# Patient Record
Sex: MALE | Race: BLACK OR AFRICAN AMERICAN | Employment: OTHER | URBAN - METROPOLITAN AREA
[De-identification: names, ages, dates, MRNs, and addresses within clinical notes are randomized per-mention and may not be internally consistent; named-entity substitution may affect disease eponyms.]

---

## 2017-05-03 ENCOUNTER — HOSPITAL ENCOUNTER (OUTPATIENT)
Dept: GENERAL RADIOLOGY | Age: 67
Discharge: HOME | End: 2017-05-03
Payer: MEDICARE

## 2017-05-03 DIAGNOSIS — R04.2 HEMOPTYSIS: ICD-10-CM

## 2017-05-03 PROCEDURE — 71020 XR CHEST PA LAT: CPT

## 2017-12-03 ENCOUNTER — HOSPITAL ENCOUNTER (EMERGENCY)
Age: 67
Discharge: HOME | End: 2017-12-03
Attending: EMERGENCY MEDICINE | Admitting: EMERGENCY MEDICINE
Payer: MEDICARE

## 2017-12-03 VITALS
HEART RATE: 74 BPM | OXYGEN SATURATION: 97 % | RESPIRATION RATE: 18 BRPM | TEMPERATURE: 97.3 F | SYSTOLIC BLOOD PRESSURE: 130 MMHG | DIASTOLIC BLOOD PRESSURE: 70 MMHG

## 2017-12-03 DIAGNOSIS — M54.2 NECK PAIN: Primary | ICD-10-CM

## 2017-12-03 PROCEDURE — 96372 THER/PROPH/DIAG INJ SC/IM: CPT

## 2017-12-03 PROCEDURE — 99283 EMERGENCY DEPT VISIT LOW MDM: CPT

## 2017-12-03 PROCEDURE — 74011250636 HC RX REV CODE- 250/636: Performed by: EMERGENCY MEDICINE

## 2017-12-03 PROCEDURE — 74011250637 HC RX REV CODE- 250/637: Performed by: EMERGENCY MEDICINE

## 2017-12-03 RX ORDER — LIDOCAINE 50 MG/G
PATCH TOPICAL
Qty: 15 EACH | Refills: 0 | Status: SHIPPED | OUTPATIENT
Start: 2017-12-03

## 2017-12-03 RX ORDER — KETOROLAC TROMETHAMINE 30 MG/ML
30 INJECTION, SOLUTION INTRAMUSCULAR; INTRAVENOUS
Status: COMPLETED | OUTPATIENT
Start: 2017-12-03 | End: 2017-12-03

## 2017-12-03 RX ORDER — DIAZEPAM 5 MG/1
5 TABLET ORAL
Qty: 12 TAB | Refills: 0 | Status: SHIPPED | OUTPATIENT
Start: 2017-12-03 | End: 2022-09-20

## 2017-12-03 RX ORDER — LIDOCAINE 50 MG/G
1 PATCH TOPICAL
Status: DISCONTINUED | OUTPATIENT
Start: 2017-12-03 | End: 2017-12-03 | Stop reason: HOSPADM

## 2017-12-03 RX ADMIN — KETOROLAC TROMETHAMINE 30 MG: 30 INJECTION, SOLUTION INTRAMUSCULAR at 04:36

## 2017-12-03 NOTE — ED PROVIDER NOTES
EMERGENCY DEPARTMENT HISTORY AND PHYSICAL EXAM    4:22 AM      Date: 12/3/2017  Patient Name: Gabrielle Bowles    History of Presenting Illness     Chief Complaint   Patient presents with    Neck Pain         History Provided By: Patient    Chief Complaint: Neck pain  Duration:  Days  Timing:  Worsening  Location: Right sided   Quality: Sharp  Severity: Severe  Modifying Factors: Tried heat packs, ibuprofen, and Tylenol with no relief   Associated Symptoms: HA      Additional History (Context):     Gabrielle Bowles is a 61 y.o. male with a pertinent history of DM and HTN presents to the ED via POV c/o non-traumatic, sharp, \"unbearable,\" right sided neck pain radiating up to head x 3 days, worsening today. Pain is described as \"like a spasm. \" States he tried heat packs, ibuprofen, and Tylenol with no relief. Pt denies extremity numbness/tingling, back pain, bowel/bladder incontinence. No other acute symptoms or complaints were noted. PCP: Momo Moreno MD    Current Facility-Administered Medications   Medication Dose Route Frequency Provider Last Rate Last Dose    lidocaine (LIDODERM) 5 % patch 1 Patch  1 Patch TransDERmal NOW Josué Golden MD   1 Patch at 12/03/17 0436     Current Outpatient Prescriptions   Medication Sig Dispense Refill    lidocaine (LIDODERM) 5 % Apply patch to the affected area for 12 hours a day and remove for 12 hours a day. 15 Each 0    diazePAM (VALIUM) 5 mg tablet Take 1 Tab by mouth every eight (8) hours as needed (pain). Max Daily Amount: 15 mg. 12 Tab 0    amLODIPine (NORVASC) 10 mg tablet Take 10 mg by mouth daily.  exenatide (BYETTA) 10 mcg/0.04 mL PnIj injection 10 mcg by SubCUTAneous route Before breakfast and dinner.  chlorthalidone (HYGROTEN) 50 mg tablet Take 50 mg by mouth daily.  dicyclomine (BENTYL) 20 mg tablet Take 20 mg by mouth two (2) times a day.  doxazosin (CARDURA) 8 mg tablet Take 8 mg by mouth daily.         insulin glargine (LANTUS) 100 unit/mL injection 55 Units by SubCUTAneous route nightly.  losartan (COZAAR) 100 mg tablet Take 100 mg by mouth daily.  metFORMIN (GLUCOPHAGE) 1,000 mg tablet Take 1,000 mg by mouth two (2) times daily (with meals).  METOPROLOL TARTRATE PO Take 200 mg by mouth two (2) times a day.  omeprazole (PRILOSEC) 40 mg capsule Take 40 mg by mouth daily.  quinapril (ACCUPRIL) 40 mg tablet Take 80 mg by mouth daily.  simvastatin (ZOCOR) 40 mg tablet Take 40 mg by mouth nightly. Past History     Past Medical History:  Past Medical History:   Diagnosis Date    Diabetes ([address]     Hypertension        Past Surgical History:  Past Surgical History:   Procedure Laterality Date    HX BACK SURGERY      HX MOHS PROCEDURES         Family History:  Family History   Problem Relation Age of Onset    Diabetes Other     Hypertension Other        Social History:  Social History   Substance Use Topics    Smoking status: Never Smoker    Smokeless tobacco: Never Used    Alcohol use No       Allergies:  No Known Allergies      Review of Systems       Review of Systems   Gastrointestinal: Negative for constipation. Musculoskeletal: Positive for neck pain. Neurological: Positive for headaches. Negative for numbness. All other systems reviewed and are negative. Physical Exam     Visit Vitals    /70 (BP 1 Location: Left arm, BP Patient Position: At rest)    Pulse 74    Temp 97.3 °F (36.3 °C)    Resp 18    SpO2 97%         Physical Exam   Constitutional: He is oriented to person, place, and time. He appears well-developed. HENT:   Head: Normocephalic and atraumatic. Eyes: Conjunctivae and EOM are normal.   Cardiovascular: Normal heart sounds. Exam reveals no gallop and no friction rub. No murmur heard. Pulmonary/Chest: Effort normal and breath sounds normal. No stridor. Abdominal: Soft. There is no tenderness. Musculoskeletal: Normal range of motion.  He exhibits no tenderness. R paracervical tightness. No midline cervical tenderness. Normal ROM of R shoulder. Neurological: He is alert and oriented to person, place, and time. Normal motor and sensation throughout all extremities      Skin: Skin is warm and dry. He is not diaphoretic. Psychiatric: He has a normal mood and affect. His behavior is normal.   Nursing note and vitals reviewed. Diagnostic Study Results     Labs -  No results found for this or any previous visit (from the past 12 hour(s)). Radiologic Studies -   No orders to display         Medical Decision Making   Provider Notes (Medical Decision Making): Here with R para cervical tenderness without any hard neurological signs, no precipitating trauma, no fever or other suggestion of meningismus. Will treat symptomatically here and dc for close PMD follow up. I am the first provider for this patient. I reviewed the vital signs, available nursing notes, past medical history, past surgical history, family history and social history. Vital Signs-Reviewed the patient's vital signs. Pulse Oximetry Analysis -  97% on room air (Interpretation)      Records Reviewed: Nursing Notes and Old Medical Records (Time of Review: 4:22 AM)    ED Course: Progress Notes, Reevaluation, and Consults:  -Re-evaluted the patient     -We discussed the diagnosis, treatment, and plan. Next steps in close outpt care include: PMD follow-up    -They verbally convey understanding and agreement of the signs, symptoms, diagnosis, treatment and prognosis and additionally agree to follow up as discussed. All questions were answered, and we reviewed pertinent return precautions as seen in the discharge paperwork. Pt understood follow up instructions, and would return to the ED if any worsening or concerns. Maura Plunkett MD  4:48 AM    Diagnosis     Clinical Impression:   1.  Neck pain        Disposition: Discharged     Follow-up Information     Follow up With Details Comments Contact Info    Dandre Dawson MD Schedule an appointment as soon as possible for a visit  [address] [address] [facility] EMERGENCY DEPT  If symptoms worsen [address] [facility] [address]  [phone]           Discharge Medication List as of 12/3/2017  4:42 AM      START taking these medications    Details   lidocaine (LIDODERM) 5 % Apply patch to the affected area for 12 hours a day and remove for 12 hours a day., Print, Disp-15 Each, R-0      diazePAM (VALIUM) 5 mg tablet Take 1 Tab by mouth every eight (8) hours as needed (pain). Max Daily Amount: 15 mg., Print, Disp-12 Tab, R-0         CONTINUE these medications which have NOT CHANGED    Details   amLODIPine (NORVASC) 10 mg tablet Take 10 mg by mouth daily. , Historical Med      exenatide (BYETTA) 10 mcg/0.04 mL PnIj injection 10 mcg by SubCUTAneous route Before breakfast and dinner.  , Historical Med      chlorthalidone (HYGROTEN) 50 mg tablet Take 50 mg by mouth daily. , Historical Med      dicyclomine (BENTYL) 20 mg tablet Take 20 mg by mouth two (2) times a day.  , Historical Med      doxazosin (CARDURA) 8 mg tablet Take 8 mg by mouth daily. , Historical Med      insulin glargine (LANTUS) 100 unit/mL injection 55 Units by SubCUTAneous route nightly.  , Historical Med      losartan (COZAAR) 100 mg tablet Take 100 mg by mouth daily. , Historical Med      metFORMIN (GLUCOPHAGE) 1,000 mg tablet Take 1,000 mg by mouth two (2) times daily (with meals). , Historical Med      METOPROLOL TARTRATE PO Take 200 mg by mouth two (2) times a day.  , Historical Med      omeprazole (PRILOSEC) 40 mg capsule Take 40 mg by mouth daily. , Historical Med      quinapril (ACCUPRIL) 40 mg tablet Take 80 mg by mouth daily.   , Historical Med      simvastatin (ZOCOR) 40 mg tablet Take 40 mg by mouth nightly.  , Historical Med           _______________________________    Attestations:  Harshad Attestation     Ly No acting as a scribe for and in the presence of Colleen Crocker MD      December 03, 2017 at 4:22 AM       Provider Attestation:      I personally performed the services described in the documentation, reviewed the documentation, as recorded by the scribe in my presence, and it accurately and completely records my words and actions.  December 03, 2017 at 4:22 AM - Colleen Crocker MD    _______________________________

## 2017-12-03 NOTE — ED TRIAGE NOTES
Pt stating his neck pain started 3-4 days ago and his pain has increased tonight. Pt is rating pain 10/10. Pt is AOX4.

## 2017-12-03 NOTE — ED NOTES
Reviewed dc instructions with patient. Pt sent home with 2 prescriptions. Pt instructed on prescription use. Pt instructed to follow up with pcp this week. Pt signed paper dc instructions; Rn placed in scan bin. Pt left ED with no distress and all belongings.

## 2018-09-21 ENCOUNTER — HOSPITAL ENCOUNTER (OUTPATIENT)
Dept: GENERAL RADIOLOGY | Age: 68
Discharge: HOME | End: 2018-09-21
Payer: MEDICARE

## 2018-09-21 DIAGNOSIS — R07.9 CHEST PAIN: ICD-10-CM

## 2018-09-21 PROCEDURE — 71046 X-RAY EXAM CHEST 2 VIEWS: CPT

## 2018-12-11 ENCOUNTER — HOSPITAL ENCOUNTER (OUTPATIENT)
Dept: CT IMAGING | Age: 68
Discharge: HOME | End: 2018-12-11
Attending: FAMILY MEDICINE
Payer: MEDICARE

## 2018-12-11 DIAGNOSIS — R04.2 COUGHING UP BLOOD: ICD-10-CM

## 2018-12-11 LAB — CREAT UR-MCNC: 1.3 MG/DL (ref 0.6–1.3)

## 2018-12-11 PROCEDURE — 74011636320 HC RX REV CODE- 636/320: Performed by: FAMILY MEDICINE

## 2018-12-11 PROCEDURE — 82565 ASSAY OF CREATININE: CPT

## 2018-12-11 PROCEDURE — 71260 CT THORAX DX C+: CPT

## 2018-12-11 RX ADMIN — IOPAMIDOL 75 ML: 612 INJECTION, SOLUTION INTRAVENOUS at 10:52

## 2019-01-16 ENCOUNTER — HOSPITAL ENCOUNTER (OUTPATIENT)
Dept: ULTRASOUND IMAGING | Age: 69
Discharge: HOME | End: 2019-01-16
Attending: FAMILY MEDICINE
Payer: MEDICARE

## 2019-01-16 DIAGNOSIS — E04.1 NONTOXIC UNINODULAR GOITER: ICD-10-CM

## 2019-01-16 PROCEDURE — 76536 US EXAM OF HEAD AND NECK: CPT

## 2020-02-03 ENCOUNTER — HOSPITAL ENCOUNTER (OUTPATIENT)
Dept: CT IMAGING | Age: 70
Discharge: HOME | End: 2020-02-03
Attending: INTERNAL MEDICINE
Payer: MEDICARE

## 2020-02-03 DIAGNOSIS — R91.1 PULMONARY NODULE: ICD-10-CM

## 2020-02-03 PROCEDURE — 71250 CT THORAX DX C-: CPT

## 2021-01-25 ENCOUNTER — CLAIMS CREATED FROM THE CLAIM WINDOW (OUTPATIENT)
Dept: URBAN - METROPOLITAN AREA CLINIC 4 | Facility: CLINIC | Age: 71
End: 2021-01-25
Payer: COMMERCIAL

## 2021-01-25 ENCOUNTER — OFFICE VISIT (OUTPATIENT)
Dept: URBAN - METROPOLITAN AREA SURGERY CENTER 13 | Facility: SURGERY CENTER | Age: 71
End: 2021-01-25
Payer: COMMERCIAL

## 2021-01-25 DIAGNOSIS — D12.4 BENIGN NEOPLASM OF DESCENDING COLON: ICD-10-CM

## 2021-01-25 DIAGNOSIS — K63.5 BENIGN COLON POLYP: ICD-10-CM

## 2021-01-25 DIAGNOSIS — Z86.010 H/O ADENOMATOUS POLYP OF COLON: ICD-10-CM

## 2021-01-25 DIAGNOSIS — D12.4 ADENOMA OF DESCENDING COLON: ICD-10-CM

## 2021-01-25 DIAGNOSIS — K63.89 OTHER SPECIFIED DISEASES OF INTESTINE: ICD-10-CM

## 2021-01-25 PROCEDURE — 45380 COLONOSCOPY AND BIOPSY: CPT | Performed by: INTERNAL MEDICINE

## 2021-01-25 PROCEDURE — 88305 TISSUE EXAM BY PATHOLOGIST: CPT | Performed by: PATHOLOGY

## 2021-01-25 PROCEDURE — G8907 PT DOC NO EVENTS ON DISCHARG: HCPCS | Performed by: INTERNAL MEDICINE

## 2021-01-25 PROCEDURE — 45385 COLONOSCOPY W/LESION REMOVAL: CPT | Performed by: INTERNAL MEDICINE

## 2021-07-15 ENCOUNTER — OFFICE VISIT (OUTPATIENT)
Dept: PULMONOLOGY | Age: 71
End: 2021-07-15

## 2021-07-15 VITALS
TEMPERATURE: 97.7 F | WEIGHT: 232.3 LBS | DIASTOLIC BLOOD PRESSURE: 80 MMHG | OXYGEN SATURATION: 94 % | RESPIRATION RATE: 22 BRPM | SYSTOLIC BLOOD PRESSURE: 128 MMHG | HEIGHT: 71 IN | HEART RATE: 86 BPM | BODY MASS INDEX: 32.52 KG/M2

## 2021-07-15 DIAGNOSIS — R06.02 SHORTNESS OF BREATH: Primary | ICD-10-CM

## 2021-07-15 DIAGNOSIS — J98.4 RESTRICTIVE LUNG DISEASE: ICD-10-CM

## 2021-07-15 DIAGNOSIS — I27.20 PULMONARY HTN (HCC): ICD-10-CM

## 2021-07-15 DIAGNOSIS — J44.9 ASTHMA-COPD OVERLAP SYNDROME (HCC): ICD-10-CM

## 2021-07-15 PROCEDURE — G8536 NO DOC ELDER MAL SCRN: HCPCS | Performed by: INTERNAL MEDICINE

## 2021-07-15 PROCEDURE — 1101F PT FALLS ASSESS-DOCD LE1/YR: CPT | Performed by: INTERNAL MEDICINE

## 2021-07-15 PROCEDURE — 94727 GAS DIL/WSHOT DETER LNG VOL: CPT | Performed by: INTERNAL MEDICINE

## 2021-07-15 PROCEDURE — 94618 PULMONARY STRESS TESTING: CPT | Performed by: INTERNAL MEDICINE

## 2021-07-15 PROCEDURE — 94729 DIFFUSING CAPACITY: CPT | Performed by: INTERNAL MEDICINE

## 2021-07-15 PROCEDURE — G8417 CALC BMI ABV UP PARAM F/U: HCPCS | Performed by: INTERNAL MEDICINE

## 2021-07-15 PROCEDURE — 99204 OFFICE O/P NEW MOD 45 MIN: CPT | Performed by: INTERNAL MEDICINE

## 2021-07-15 PROCEDURE — G8427 DOCREV CUR MEDS BY ELIG CLIN: HCPCS | Performed by: INTERNAL MEDICINE

## 2021-07-15 PROCEDURE — 94060 EVALUATION OF WHEEZING: CPT | Performed by: INTERNAL MEDICINE

## 2021-07-15 PROCEDURE — 3017F COLORECTAL CA SCREEN DOC REV: CPT | Performed by: INTERNAL MEDICINE

## 2021-07-15 PROCEDURE — G8510 SCR DEP NEG, NO PLAN REQD: HCPCS | Performed by: INTERNAL MEDICINE

## 2021-07-15 NOTE — PROGRESS NOTES
[facility]  Pulmonary, Critical Care, and Sleep Medicine      Pulmonary Office Initial Consultation    Name: Bailey Devine     : 1950     Date: 7/15/2021        Subjective:   Patient has been referred for evaluation of: SOB, -abnormal PFT at PCP clinic in 2019, history of exposure to airborne occupational aerosols. Patient is a 78 y.o. male gives a history of progressive shortness of breath over the past 5 to 6 years. He describes the shortness of breath predominantly with activity affecting activities of daily living over the time described. He states that he stays short of breath with any physical activity and especially if he were to walk in the grocery store where he actually has to use the shopping basket to support himself and walk. He denies symptoms at rest denies any paroxysmal nocturnal dyspnea but admits to having orthopnea having to sleep in a recliner. Patient states that he has been trying to find out what the problem is since he has significant exposure over the years during his working career at [employer] which later was [employer]. He worked for 30 years and had exposure to significant aerosols, dust related to spraying pesticides and several other chemicals. He retired in . Patient is a never smoker  He denies cough, chest pain  Denies any pedal edema  Does have some arthritic pain-usually some stiffness in hand joints. 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   Environmental exposures- lives independently has 1 dog  ILD history:  No Hx of connective tissue disease such as RA, Lupus, Scleroderma  No Hx Raynauds  No Hx sarcoidosis  No Hx taking medications- methotrexate, Amiodarone, Nitrofurantoin  No Hx cancer, chemotherapy, radiation  No Hx Birds, chickens, farm animals  No Hx using hair spray  Possible exposure to  asbestos exposure, chemicals, pesticides  No Hx smoking  No Hx TB/positive PPD  No Hx covid-19 pneumonia       Review of data:  I have personally reviewed all data-clinical encounters, imaging, outside test results pertinent to patient's care. Testing:  CXR  CT scan-12/11/2018 and 2/3/2020 with findings of a stable 9 mm left lower lobe nodule  P[facility] in September 2019 with FEV1 of 54%  Echo-results not available    Past Medical History:   Diagnosis Date    Diabetes ([facility] [address]     Hypertension        Past Surgical History:   Procedure Laterality Date    HX BACK SURGERY      HX MOHS PROCEDURES         Social History     Socioeconomic History    Marital status:      Spouse name: Not on file    Number of children: Not on file    Years of education: Not on file    Highest education level: Not on file   Tobacco Use    Smoking status: Never Smoker    Smokeless tobacco: Never Used   Substance and Sexual Activity    Alcohol use: No    Drug use: No     Social Determinants of Health     Financial Resource Strain:     Difficulty of Paying Living Expenses:    Food Insecurity:     Worried About Running Out of Food in the Last Year:     [address] in the Last Year:    Transportation Needs:     Lack of Transportation (Medical):      Lack of Transportation (Non-Medical):    Physical Activity:     Days of Exercise per Week:     Minutes of Exercise per Session:    Stress:     Feeling of Stress :    Social Connections:     Frequency of Communication with Friends and Family:     Frequency of Social Gatherings with Friends and Family:     Attends Voodoo Services:     Active Member of Clubs or Organizations:     Attends Club or Organization Meetings:     Marital Status:        Family History   Problem Relation Age of Onset    Diabetes Other     Hypertension Other        No Known Allergies    . Current Outpatient Medications   Medication Sig Dispense Refill    chlorthalidone (HYGROTEN) 50 mg tablet Take 50 mg by mouth daily.  dicyclomine (BENTYL) 20 mg tablet Take 20 mg by mouth two (2) times a day.  doxazosin (CARDURA) 8 mg tablet Take 8 mg by mouth daily.  metFORMIN (GLUCOPHAGE) 1,000 mg tablet Take 1,000 mg by mouth two (2) times daily (with meals).  omeprazole (PRILOSEC) 40 mg capsule Take 40 mg by mouth daily.  simvastatin (ZOCOR) 40 mg tablet Take 40 mg by mouth nightly.  lidocaine (LIDODERM) 5 % Apply patch to the affected area for 12 hours a day and remove for 12 hours a day. (Patient not taking: Reported on 7/15/2021) 15 Each 0    diazePAM (VALIUM) 5 mg tablet Take 1 Tab by mouth every eight (8) hours as needed (pain). Max Daily Amount: 15 mg. (Patient not taking: Reported on 7/15/2021) 12 Tab 0    amLODIPine (NORVASC) 10 mg tablet Take 10 mg by mouth daily. (Patient not taking: Reported on 7/15/2021)      exenatide (BYETTA) 10 mcg/0.04 mL PnIj injection 10 mcg by SubCUTAneous route Before breakfast and dinner. (Patient not taking: Reported on 7/15/2021)      insulin glargine (LANTUS) 100 unit/mL injection 55 Units by SubCUTAneous route nightly. (Patient not taking: Reported on 7/15/2021)      losartan (COZAAR) 100 mg tablet Take 100 mg by mouth daily. (Patient not taking: Reported on 7/15/2021)      METOPROLOL TARTRATE PO Take 200 mg by mouth two (2) times a day. (Patient not taking: Reported on 7/15/2021)      quinapril (ACCUPRIL) 40 mg tablet Take 80 mg by mouth daily.    (Patient not taking: Reported on 7/15/2021)           Review of Systems:  HEENT: No epistaxis, no nasal drainage, no difficulty in swallowing, no redness in eyes  Respiratory: as above  Cardiovascular: no chest pain, no palpitations, no chronic leg edema, no syncope  Gastrointestinal: no abd pain, no vomiting, no diarrhea, no bleeding symptoms  Genitourinary: No urinary symptoms or hematuria  Integument/breast: No ulcers or rashes  Musculoskeletal:Neg  Neurological: No focal weakness, no seizures, no headaches  Behvioral/Psych: No anxiety, no depression  Constitutional: No fever, no chills, no weight loss, no night sweats     Objective:     Visit Vitals  /80 (BP 1 Location: Left upper arm, BP Patient Position: Sitting, BP Cuff Size: Adult)   Pulse 86   Temp 97.7 °F (36.5 °C) (Oral)   Resp 22   Ht 5' 11\" (1.803 m)   Wt 105.4 kg (232 lb 4.8 oz)   SpO2 94%   BMI 32.40 kg/m²        Physical Exam:   General: comfortable, no acute distress  HEENT: pupils reactive, sclera anicteric, EOM intact  Neck: No adenopathy or thyroid swelling, no lymphadenopathy or JVD, supple  CVS: S1S2 no murmurs  RS: Mod AE bilaterally, no tactile fremitus or egophony, no accessory muscle use  Abd: soft, non tender, no hepatosplenomegaly  Neuro: non focal, awake, alert  Extrm: no leg edema, clubbing or cyanosis  Skin: no rash    Data review:   Pertinent labs: CBC, BMP, LFT's      PFT:    Date FVC FEV1  FEV1/FVC [id number] TLC RV RV/TLC VC DLCO   7/15/2021  55  48  67  33  71  84  117  64  67/106                                         6 min walk test; completed 7/15/2021    No significant oxygen desaturation    No results found for this or any previous visit. Imaging:  I have personally reviewed the patients radiographs and have reviewed the reports:  XR Results (most recent):  Results from Hospital Encounter encounter on 09/21/18    XR CHEST PA LAT    Narrative  EXAMINATION: Chest 2 views    INDICATION: Chest pain    COMPARISON: 5/3/2017    FINDINGS: Frontal and lateral views of the chest obtained. No consolidation. Mediastinal silhouette and pulmonary vasculature unremarkable. A few aortic arch  calcifications. No evidence of pneumothorax. No acute osseous findings. Impression  IMPRESSION:  1.  No acute findings. CT Results (most recent):  Results from Hospital Encounter encounter on 02/03/20    CT CHEST WO CONT    Narrative  Noncontrast CT chest    CPT code 46655    CLINICAL HISTORY: COPD    TECHNIQUE: 5 mm helical MDCT scan of the chest without contrast. Sagittal and  coronal reformations then created with original data set. All CT scans at this  facility are performed using dose optimization techniques as appropriate to a  performed exam, to include automated exposure control, adjustment of the mA  and/or kV according to patient's size (including appropriate matching for site  specific examinations), or use of iterative reconstruction technique. COMPARISON: 12/11/2018    FINDINGS:    Lungs: There is 9 mm left lower lobe pulmonary nodule (30), stable since prior  study. Lungs otherwise clear. Airways: Patent    Pleural space: No effusions    Heart and pericardium: Marked coronary artery calcifications of the LAD. No  pericardial effusion    Great vessels and mediastinum: Mild burden arteriosclerosis aortic arch. Main  pulmonary artery measures 29 mm. Aorta the same level measures 30 mm. Right  pulmonary artery 24 mm. Left pulmonary artery 26 mm. Focal dilatation of left  descending pulmonary artery, 12 mm compared with the right which measures 6 mm. Lymph nodes: No adenopathy    Base of neck: Thyroid gland normal. No adenopathy. Upper abdomen: Included solid organs and hollow viscera are unremarkable. MSK/body wall: Previous cervical fusion. Hypertrophic right AC joint. Hypertrophic sternoclavicular junctions, sternomanubrial junction    Impression  IMPRESSION:    Stable left lower lobe pulmonary nodule. Prominent pulmonary arteries. Possibly developing pulmonary artery hypertension. Coronary artery disease. .   There is no problem list on file for this patient.       IMPRESSION:   · Shortness of breath-progressive over past 5 years predominantly exertional with history of occupational exposure to chemical and pesticide inhalation and PFT findings of combined obstructive and additional restrictive ventilatory impairment. Appears to have fixed airways obstruction likely asthma with remodeling leading to progressive symptomatic worsening. Restrictive component likely from body habitus with abdominal obesity since diffusion capacity corrects for alveolar volume. In addition to functional and structural lung impairment additional component of pulmonary hypertension with diastolic heart failure may also be playing a role-patient has symptoms of orthopnea  · Lung nodule-stable 9 mm nodule in left lower lobe. Unclear etiology but likely benign with no change over 2 years of imaging  · Diabetes  · Hypertension      RECOMMENDATIONS:   · I have discussed differential diagnosis with the patient and made following recommendations  · We will complete 6-minute walk  · Check echocardiogram for quantification of pulmonary hypertension diastolic heart failure  · Obtain HRCT looking for occult ILD as well as stability of the nodule  · Once testing completed will discuss with patient regarding any therapeutic interventions to alleviate his symptoms of dyspnea  · Maintain healthy weight  · Maintain active lifestyle  · Consider evaluation for sleep apnea as additional factor  · Preventive vaccinations-has received Covid vaccine  · Will provide more information about patient's occupational exposure and impact on current clinical state once work-up has been completed. · Follow-up in 2 months     Health maintenance screens deferred to Primary care provider. Deshawn Maharaj MD    This patient has a high complexity chronic care condition   This Visit needed High complexity medically necessary decision making and management plans.

## 2021-07-15 NOTE — LETTER
7/15/2021    Patient: Gladis Mcmahon   YOB: 1950   Date of Visit: 7/15/2021     Anh Johnson MD  [facility] [address]  Via Fax: [address], [address] [address]  Via Fax: [phone]    Dear MD Ryland Darden MD,      Thank you for referring Mr. Gladis Mcmahon to [address] [place] [address] for evaluation. My notes for this consultation are attached. If you have questions, please do not hesitate to call me. I look forward to following your patient along with you.       Sincerely,    Ernst Gross MD

## 2021-07-15 NOTE — PROGRESS NOTES
Daniel Tineo presents today for   Chief Complaint   Patient presents with    Shortness of Breath    Results     Chest CT        Is someone accompanying this pt? No    Is the patient using any DME equipment during OV? No    -DME Company NA    Depression Screening:  3 most recent PHQ Screens 7/15/2021   Little interest or pleasure in doing things Not at all   Feeling down, depressed, irritable, or hopeless Not at all   Total Score PHQ 2 0       Learning Assessment:  Learning Assessment 7/15/2021   PRIMARY LEARNER Patient   PRIMARY LANGUAGE ENGLISH   LEARNER PREFERENCE PRIMARY DEMONSTRATION   ANSWERED BY Patient   RELATIONSHIP SELF       Abuse Screening:  No flowsheet data found. Fall Risk  Fall Risk Assessment, last 12 mths 7/15/2021   Able to walk? Yes   Fall in past 12 months? 0   Do you feel unsteady? 0   Are you worried about falling 0         Coordination of Car1. Have you been to the ER, urgent care clinic since your last visit? Hospitalized since your last visit? No       2. Have you seen or consulted any other health care providers outside of the [address] [place] [address] Alessandro since your last visit? Include any pap smears or colon screening.  No

## 2021-07-28 ENCOUNTER — HOSPITAL ENCOUNTER (OUTPATIENT)
Dept: CT IMAGING | Age: 71
Discharge: HOME | End: 2021-07-28
Attending: INTERNAL MEDICINE
Payer: MEDICARE

## 2021-07-28 ENCOUNTER — HOSPITAL ENCOUNTER (OUTPATIENT)
Dept: NON INVASIVE DIAGNOSTICS | Age: 71
Discharge: HOME | End: 2021-07-28
Attending: INTERNAL MEDICINE
Payer: MEDICARE

## 2021-07-28 VITALS
SYSTOLIC BLOOD PRESSURE: 128 MMHG | HEIGHT: 71 IN | BODY MASS INDEX: 32.48 KG/M2 | DIASTOLIC BLOOD PRESSURE: 80 MMHG | WEIGHT: 232 LBS

## 2021-07-28 DIAGNOSIS — I27.20 PULMONARY HTN (HCC): ICD-10-CM

## 2021-07-28 DIAGNOSIS — R06.02 SHORTNESS OF BREATH: ICD-10-CM

## 2021-07-28 LAB
ECHO AO ROOT DIAM: 3.31 CM
ECHO LA AREA 4C: 15.89 CM2
ECHO LA VOL 2C: 40.27 ML (ref 18–58)
ECHO LA VOL 4C: 39.42 ML (ref 18–58)
ECHO LA VOL BP: 46.59 ML (ref 18–58)
ECHO LA VOL/BSA BIPLANE: 20.71 ML/M2 (ref 16–28)
ECHO LA VOLUME INDEX A2C: 17.9 ML/M2 (ref 16–28)
ECHO LA VOLUME INDEX A4C: 17.52 ML/M2 (ref 16–28)
ECHO LV E' LATERAL VELOCITY: 7.83 CM/S
ECHO LV E' SEPTAL VELOCITY: 9.12 CM/S
ECHO LV INTERNAL DIMENSION DIASTOLIC: 4.56 CM (ref 4.2–5.9)
ECHO LV INTERNAL DIMENSION SYSTOLIC: 3.45 CM
ECHO LV IVSD: 1.16 CM (ref 0.6–1)
ECHO LV MASS 2D: 202.2 G (ref 88–224)
ECHO LV MASS INDEX 2D: 89.9 G/M2 (ref 49–115)
ECHO LV POSTERIOR WALL DIASTOLIC: 1.24 CM (ref 0.6–1)
ECHO LVOT CARDIAC OUTPUT: 4.11 LITER/MINUTE
ECHO LVOT DIAM: 2.05 CM
ECHO LVOT PEAK GRADIENT: 3.04 MMHG
ECHO LVOT PEAK VELOCITY: 87.16 CM/S
ECHO LVOT SV: 59.9 ML
ECHO LVOT VTI: 18.11 CM
ECHO MV A VELOCITY: 68.7 CM/S
ECHO MV E DECELERATION TIME (DT): 209.67 MS
ECHO MV E VELOCITY: 42.87 CM/S
ECHO MV E/A RATIO: 0.62
ECHO MV E/E' LATERAL: 5.48
ECHO MV E/E' RATIO (AVERAGED): 5.09
ECHO MV E/E' SEPTAL: 4.7
ECHO RV TAPSE: 2.16 CM (ref 1.5–2)
ECHO TV REGURGITANT MAX VELOCITY: 280.84 CM/S
ECHO TV REGURGITANT PEAK GRADIENT: 31.55 MMHG
LVOT MG: 1.33 MMHG

## 2021-07-28 PROCEDURE — 93306 TTE W/DOPPLER COMPLETE: CPT

## 2021-07-28 PROCEDURE — 71250 CT THORAX DX C-: CPT

## 2021-09-30 ENCOUNTER — OFFICE VISIT (OUTPATIENT)
Dept: PULMONOLOGY | Age: 71
End: 2021-09-30
Payer: MEDICARE

## 2021-09-30 VITALS
BODY MASS INDEX: 31.36 KG/M2 | RESPIRATION RATE: 18 BRPM | DIASTOLIC BLOOD PRESSURE: 73 MMHG | SYSTOLIC BLOOD PRESSURE: 125 MMHG | WEIGHT: 224 LBS | HEIGHT: 71 IN | OXYGEN SATURATION: 96 % | HEART RATE: 68 BPM | TEMPERATURE: 97.6 F

## 2021-09-30 DIAGNOSIS — J44.9 ASTHMA-COPD OVERLAP SYNDROME (HCC): Primary | ICD-10-CM

## 2021-09-30 DIAGNOSIS — R91.1 LUNG NODULE: ICD-10-CM

## 2021-09-30 DIAGNOSIS — J98.4 RESTRICTIVE LUNG DISEASE: ICD-10-CM

## 2021-09-30 PROCEDURE — 99214 OFFICE O/P EST MOD 30 MIN: CPT | Performed by: INTERNAL MEDICINE

## 2021-09-30 PROCEDURE — 1101F PT FALLS ASSESS-DOCD LE1/YR: CPT | Performed by: INTERNAL MEDICINE

## 2021-09-30 PROCEDURE — G8427 DOCREV CUR MEDS BY ELIG CLIN: HCPCS | Performed by: INTERNAL MEDICINE

## 2021-09-30 PROCEDURE — 3017F COLORECTAL CA SCREEN DOC REV: CPT | Performed by: INTERNAL MEDICINE

## 2021-09-30 PROCEDURE — G8536 NO DOC ELDER MAL SCRN: HCPCS | Performed by: INTERNAL MEDICINE

## 2021-09-30 PROCEDURE — G8432 DEP SCR NOT DOC, RNG: HCPCS | Performed by: INTERNAL MEDICINE

## 2021-09-30 PROCEDURE — G8417 CALC BMI ABV UP PARAM F/U: HCPCS | Performed by: INTERNAL MEDICINE

## 2021-09-30 NOTE — PROGRESS NOTES
[facility]  Pulmonary, Critical Care, and Sleep Medicine      Pulmonary Office follow up    Name: Da Levin     : 1950     Date: 2021        Subjective:   Patient has been referred for evaluation of: SOB, -abnormal PFT at PCP clinic in 2019, history of exposure to airborne occupational aerosols. 21   Patient reports no new symptoms-has shortness of breath and occasional cough. He has been prescribed an inhaler by [facility] care but cannot tell me the name of the inhaler and it is not on record. I have shown him a chart of all the inhalers but he cannot identify and states that he will call us back after he gets home with appropriate information. Since his last visit he has completed requested testing-CT scan of the chest, PFT and 6-minute walk test and here to discuss further  Denies any new concerns but wanted his records to take to his  for a class action suit. HPI  Patient is a 78 y.o. male gives a history of progressive shortness of breath over the past 5 to 6 years. He describes the shortness of breath predominantly with activity affecting activities of daily living over the time described. He states that he stays short of breath with any physical activity and especially if he were to walk in the grocery store where he actually has to use the shopping basket to support himself and walk. He denies symptoms at rest denies any paroxysmal nocturnal dyspnea but admits to having orthopnea having to sleep in a recliner. Patient states that he has been trying to find out what the problem is since he has significant exposure over the years during his working career at [employer] which later was [place]. He worked for 30 years and had exposure to significant aerosols, dust related to spraying pesticides and several other chemicals. He retired in .   Patient is a never smoker  He denies cough, chest pain  Denies any pedal edema  Does have some arthritic pain-usually some stiffness in hand joints. 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 Environmental exposures- lives independently has 1 dog  ILD history:  No Hx of connective tissue disease such as RA, Lupus, Scleroderma  No Hx Raynauds  No Hx sarcoidosis  No Hx taking medications- methotrexate, Amiodarone, Nitrofurantoin  No Hx cancer, chemotherapy, radiation  No Hx Birds, chickens, farm animals  No Hx using hair spray  Possible exposure to  asbestos exposure, chemicals, pesticides  No Hx smoking  No Hx TB/positive PPD  No Hx covid-19 pneumonia       Review of data:  I have personally reviewed all data-clinical encounters, imaging, outside test results pertinent to patient's care.   Testing:  CXR  CT scan-12/11/2018 and 2/3/2020 with findings of a stable 9 mm left lower lobe nodule  [facility] at [facility] in September 2019 with FEV1 of 54%  Echo-results not available    Past Medical History:   Diagnosis Date    Diabetes ([facility] [address]     Hypertension        Past Surgical History:   Procedure Laterality Date    HX BACK SURGERY      HX MOHS PROCEDURES         Social History     Socioeconomic History    Marital status:      Spouse name: Not on file    Number of children: Not on file    Years of education: Not on file    Highest education level: Not on file   Tobacco Use    Smoking status: Never Smoker    Smokeless tobacco: Never Used   Substance and Sexual Activity    Alcohol use: No    Drug use: No     Social Determinants of Health     Financial Resource Strain:     Difficulty of Paying Living Expenses:    Food Insecurity:     Worried About Running Out of Food in the Last Year:     [employer] in the Last Year:    Transportation Needs:     Lack of Transportation (Medical):  Lack of Transportation (Non-Medical):    Physical Activity:     Days of Exercise per Week:     Minutes of Exercise per Session:    Stress:     Feeling of Stress :    Social Connections:     Frequency of Communication with Friends and Family:     Frequency of Social Gatherings with Friends and Family:     Attends Anabaptist Services:     Active Member of Clubs or Organizations:     Attends Club or Organization Meetings:     Marital Status:        Family History   Problem Relation Age of Onset    Diabetes Other     Hypertension Other     No Known Problems Mother     Hypertension Father        No Known Allergies    . Current Outpatient Medications   Medication Sig Dispense Refill    chlorthalidone (HYGROTEN) 50 mg tablet Take 50 mg by mouth daily.  dicyclomine (BENTYL) 20 mg tablet Take 20 mg by mouth two (2) times a day.  metFORMIN (GLUCOPHAGE) 1,000 mg tablet Take 1,000 mg by mouth two (2) times daily (with meals).  omeprazole (PRILOSEC) 40 mg capsule Take 40 mg by mouth daily.  simvastatin (ZOCOR) 40 mg tablet Take 40 mg by mouth nightly.  lidocaine (LIDODERM) 5 % Apply patch to the affected area for 12 hours a day and remove for 12 hours a day. (Patient not taking: Reported on 7/15/2021) 15 Each 0    diazePAM (VALIUM) 5 mg tablet Take 1 Tab by mouth every eight (8) hours as needed (pain). Max Daily Amount: 15 mg. (Patient not taking: Reported on 9/30/2021) 12 Tab 0    amLODIPine (NORVASC) 10 mg tablet Take 10 mg by mouth daily. (Patient not taking: Reported on 9/30/2021)      exenatide (BYETTA) 10 mcg/0.04 mL PnIj injection 10 mcg by SubCUTAneous route Before breakfast and dinner. (Patient not taking: Reported on 7/15/2021)      doxazosin (CARDURA) 8 mg tablet Take 8 mg by mouth daily.    (Patient not taking: Reported on 9/30/2021)      insulin glargine (LANTUS) 100 unit/mL injection 55 Units by SubCUTAneous route nightly. (Patient not taking: Reported on 7/15/2021)      losartan (COZAAR) 100 mg tablet Take 100 mg by mouth daily. (Patient not taking: Reported on 7/15/2021)      METOPROLOL TARTRATE PO Take 200 mg by mouth two (2) times a day. (Patient not taking: Reported on 7/15/2021)      quinapril (ACCUPRIL) 40 mg tablet Take 80 mg by mouth daily.    (Patient not taking: Reported on 7/15/2021)           Review of Systems:  HEENT: No epistaxis, no nasal drainage, no difficulty in swallowing, no redness in eyes  Respiratory: as above  Cardiovascular: no chest pain, no palpitations, no chronic leg edema, no syncope  Gastrointestinal: no abd pain, no vomiting, no diarrhea, no bleeding symptoms  Genitourinary: No urinary symptoms or hematuria  Integument/breast: No ulcers or rashes  Musculoskeletal:Neg  Neurological: No focal weakness, no seizures, no headaches  Behvioral/Psych: No anxiety, no depression  Constitutional: No fever, no chills, no weight loss, no night sweats     Objective:     Visit Vitals  /73 (BP 1 Location: Left upper arm, BP Patient Position: Sitting, BP Cuff Size: Large adult)   Pulse 68   Temp 97.6 °F (36.4 °C) (Oral)   Resp 18   Ht 5' 11\" (1.803 m)   Wt 101.6 kg (224 lb)   SpO2 96% Comment: RA Rest   BMI 31.24 kg/m²        Physical Exam:   General: comfortable, no acute distress  HEENT: pupils reactive, sclera anicteric, EOM intact  Neck: No adenopathy or thyroid swelling, no lymphadenopathy or JVD, supple  CVS: S1S2 no murmurs  RS: Mod AE bilaterally, no tactile fremitus or egophony, no accessory muscle use  Abd: soft, non tender, no hepatosplenomegaly  Neuro: non focal, awake, alert  Extrm: no leg edema, clubbing or cyanosis  Skin: no rash    Data review:   Pertinent labs: CBC, BMP, LFT's      PFT:    Date FVC FEV1  FEV1/FVC [id number] TLC RV RV/TLC VC DLCO   7/15/2021  55  48  67  33  71  84  117  64  67/106                                         6 min walk test; completed 7/15/2021    No significant oxygen desaturation    No results found for this or any previous visit. Imaging:  I have personally reviewed the patients radiographs and have reviewed the reports:  XR Results (most recent):  Results from Hospital Encounter encounter on 09/21/18    XR CHEST PA LAT    Narrative  EXAMINATION: Chest 2 views    INDICATION: Chest pain    COMPARISON: 5/3/2017    FINDINGS: Frontal and lateral views of the chest obtained. No consolidation. Mediastinal silhouette and pulmonary vasculature unremarkable. A few aortic arch  calcifications. No evidence of pneumothorax. No acute osseous findings. Impression  IMPRESSION:  1. No acute findings. CT Results (most recent):  Results from Hospital Encounter encounter on 07/28/21    CT CHEST WO CONT    Narrative  EXAM:  CT Chest without Contrast.    CLINICAL INDICATION:  Shortness of breath (R06.02). COMPARISON:  02/03/20, 12/11/18. TECHNIQUE:    - Helically scanned volumetric axial sections of the chest are obtained without  IV contrast administration. Coronal and sagittal multiplanar reconstruction  images are generated for improved anatomic delineation.  - Radiation dose optimization techniques are utilized as appropriate to the  exam, with combination of automated exposure control, adjustment of the mA  and/or kV according to patient's size (Including appropriate matching for  site-specific examinations), or use of iterative reconstruction technique. FINDINGS:    Lungs:    - Stable left lower lobe superior segment circumscribed lobulated 0.9 x 0.7 cm  nodule (axial #31). Unchanged dating back to 2018. 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 Similar  appearance dating back to at least 12/11/18.  - No airspace opacities are noted.   No dominant lung mass or suspicious new lung  nodule is detected in both lungs. Pleura:  No significant pleural effusion is detected bilaterally. Mediastinum:  No adenopathy. Base of Neck, Axillae, Chest Wall:  No adenopathy. Bilateral gynecomastia. Esophagus:  Unremarkable. Abdomen:  No acute abnormalities in the visualized portions of the upper  abdomen. Bones:  No acute findings. Impression  1. Stable left lower lobe superior segment nodule. Of benign nature based on  long-term stability. 2.  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 3.  Bilateral gynecomastia. .     Patient Active Problem List   Diagnosis Code    SOB (shortness of breath) R06.02    [facility] J44.9    Restrictive lung disease J98.4       IMPRESSION:   · Shortness of breath-progressive over past 5 years predominantly exertional with history of occupational exposure to chemical and pesticide inhalation and PFT findings of combined obstructive and additional restrictive ventilatory impairment. Appears to have fixed airways obstruction likely asthma with remodeling leading to progressive symptomatic worsening. Restrictive component likely from body habitus with abdominal obesity since diffusion capacity corrects for alveolar volume. In addition to functional and structural lung impairment additional component of pulmonary hypertension with diastolic heart failure may also be playing a role. No significant pulmonary hypertension noted  · Lung nodule-stable 9 mm nodule in left lower lobe. Unclear etiology but likely benign with no change over 2 years of imaging  Stable left lower lobe superior segment circumscribed lobulated 0.9 x 0.7 cm  nodule (axial #31). Unchanged dating back to 2018. 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   Similar  appearance dating back to at least 12/11/18.  - No airspace opacities are noted. No dominant lung mass or suspicious new lung  nodule is detected in both lungs. · Diabetes  · Hypertension      RECOMMENDATIONS:   · I have discussed differential diagnosis with the patient and made following recommendations  · Discussed results of 6-minute walk. No significant oxygen desaturation noted  · Discussed results of echocardiogram  · HRCT-stable nodule. No significant ILD  · Continue bronchodilators-benefit from continued use of long-acting agent and short-acting for rescue  · Maintain healthy weight  · Maintain active lifestyle  · Consider evaluation for sleep apnea as additional factor  · Preventive vaccinations-has received Covid vaccine  · Provided patients with printed report of his CAT scan and PFTs  · Preventive vaccinations recommended  · Follow-up in 4 months and sooner should there be any change     Health maintenance screens deferred to Primary care provider. Rolando Mcdaniel MD    This patient has a high complexity chronic care condition   This Visit needed High complexity medically necessary decision making and management plans.

## 2021-09-30 NOTE — PATIENT INSTRUCTIONS
COPD and Asthma: Care Instructions  Overview     Some people who have chronic obstructive pulmonary disease (COPD) may also have asthma. With both of these conditions, air doesn't flow easily in and out of your lungs. This can make it hard to breathe. You may be short of breath, wheeze, cough, or have mucus in your airways. When you have COPD and asthma, it's important to follow your treatment plan. There are two parts to your treatment:  · Controlling COPD and asthma over the long term. · Treating attacks or flare-ups when they occur. You and your doctor can make a plan that will help. This plan tells you the medicines you take to manage your symptoms and prevent attacks or flare-ups. It also tells you what to do if you have an attack or flare-up. Follow-up care is a key part of your treatment and safety. Be sure to make and go to all appointments, and call your doctor if you are having problems. It's also a good idea to know your test results and keep a list of the medicines you take. How can you care for yourself at home? To control COPD and asthma  · Do not smoke. Smoking can make COPD and asthma worse. If you need help quitting, talk to your doctor about stop-smoking programs and medicines. These can increase your chances of quitting for good. · Learn what sets off your COPD and asthma. Avoid these triggers when you can. Common triggers include smoke, pollen, pollution, and infections like colds, the flu, or pneumonia. · Check yourself for symptoms to know which step to follow in your action plan. Watch for things like being short of breath, having chest tightness, and coughing more than usual. Look for a change in the color or thickness of your mucus. Also notice if symptoms wake you up at night or if you get tired quickly when you exercise. · Check your lungs with a peak flow meter, if your doctor recommends it. Peak flow can tell you how well your lungs are working.   · Try pulmonary rehabilitation, if your doctor prescribes it. This combines different treatments to help you reduce your symptoms and stay as active and healthy as you can. Medicines   · Call your doctor if you think you are having a problem with your medicine. COPD and asthma are treated with medicine to help you breathe easier. You may take:  ? Controller medicines. These prevent attacks and flare-ups before they happen. They are taken regularly. There are different types. ? Quick-relief medicine. This is for times when you can't prevent symptoms and need to treat them fast. It can quickly relax the airways and allow you to breathe easier. · Learn how to use your inhalers the right way. Ask your doctor or pharmacist for help. · Use oxygen if your doctor recommends it. Oxygen therapy boosts the amount of oxygen in your blood and helps you breathe easier. Use the flow rate your doctor recommends. Do not change it without talking to your doctor first.  Preventing infections   · Wash your hands often. · Avoid contact with people who have colds and other respiratory infections. · Get a flu vaccine every year. Ask your doctor about getting the pneumococcal and whooping cough (pertussis) shots. To treat attacks when they occur  · Follow your action plan. It can tell you what to do when you have an attack or flare-up. · Take your quick-relief medicine exactly as prescribed. Talk with your doctor if you have any problems with your medicine. ? Keep this medicine with you at all times. ? You may need to use this medicine before you exercise to prevent an attack. · If your doctor prescribed corticosteroid pills or other medicines to use during an attack or flare-up, take them as directed. They may shorten the attack and help you breathe easier. When should you call for help? Call 911 anytime you think you may need emergency care.  For example, call if:    · You have severe trouble breathing.     · You are having chest pain that is different or worse than usual.   Call your doctor now or seek immediate medical care if:    · You have new or worse shortness of breath.     · You cough up blood.     · You have a fever.     · You have used your quick-relief medicine but you are still short of breath.     · You have feelings of anxiety or depression. Watch closely for changes in your health, and be sure to contact your doctor if:    · You are coughing more deeply or more often, or you notice more mucus or a change in the color of your mucus.     · You have new or worse swelling in your legs or belly.     · You are not getting better as expected. Where can you learn more? Go to http://www.gray.com/  Enter A350 in the search box to learn more about \"COPD and Asthma: Care Instructions. \"  Current as of: July 6, 2021               Content Version: 13.0  © 6208-5237 [employer]wise, [employer]. Care instructions adapted under license by [employer] (which disclaims liability or warranty for this information). If you have questions about a medical condition or this instruction, always ask your healthcare professional. [address] any warranty or liability for your use of this information.

## 2021-09-30 NOTE — LETTER
9/30/2021    Patient: Salma Kong   YOB: 1950   Date of Visit: 9/30/2021     Aurelio Burgos MD  [facility] [address]  Via Fax: [phone]    Dear Aurelio Burgos MD,      Thank you for referring Mr. Abdulaziz Infante to [address] [place] [address] for evaluation. My notes for this consultation are attached. If you have questions, please do not hesitate to call me. I look forward to following your patient along with you.       Sincerely,    Janak Fernández MD

## 2021-09-30 NOTE — PROGRESS NOTES
Lisa Hernández presents today for   Chief Complaint   Patient presents with    Results     Chest CT, Echo,,6 Minute walk       Is someone accompanying this pt? No    Is the patient using any DME equipment during OV? No    -DME Company NA    Depression Screening:  3 most recent PHQ Screens 7/15/2021   Little interest or pleasure in doing things Not at all   Feeling down, depressed, irritable, or hopeless Not at all   Total Score PHQ 2 0       Learning Assessment:  Learning Assessment 7/15/2021   PRIMARY LEARNER Patient   PRIMARY LANGUAGE ENGLISH   LEARNER PREFERENCE PRIMARY DEMONSTRATION   ANSWERED BY Patient   RELATIONSHIP SELF       Abuse Screening:  No flowsheet data found. Fall Risk  Fall Risk Assessment, last 12 mths 7/15/2021   Able to walk? Yes   Fall in past 12 months? 0   Do you feel unsteady? 0   Are you worried about falling 0         Coordination of Care:  1. Have you been to the ER, urgent care clinic since your last visit? Hospitalized since your last visit? No    2. Have you seen or consulted any other health care providers outside of the [address] [place] [address] since your last visit? Include any pap smears or colon screening.  No

## 2022-03-18 PROBLEM — J44.89 ASTHMA-COPD OVERLAP SYNDROME (HCC): Status: ACTIVE | Noted: 2021-07-15

## 2022-03-18 PROBLEM — J98.4 RESTRICTIVE LUNG DISEASE: Status: ACTIVE | Noted: 2021-07-15

## 2022-03-18 PROBLEM — J44.9 ASTHMA-COPD OVERLAP SYNDROME (HCC): Status: ACTIVE | Noted: 2021-07-15

## 2022-03-19 PROBLEM — R91.1 LUNG NODULE: Status: ACTIVE | Noted: 2021-09-30

## 2022-03-20 PROBLEM — R06.02 SOB (SHORTNESS OF BREATH): Status: ACTIVE | Noted: 2021-07-15

## 2022-10-31 ENCOUNTER — HOSPITAL ENCOUNTER (OUTPATIENT)
Dept: RADIATION THERAPY | Age: 72
Discharge: HOME | End: 2022-10-31
Payer: MEDICARE

## 2022-10-31 PROCEDURE — 99211 OFF/OP EST MAY X REQ PHY/QHP: CPT

## 2022-10-31 PROCEDURE — 99205 OFFICE O/P NEW HI 60 MIN: CPT | Performed by: RADIOLOGY

## 2022-12-16 ENCOUNTER — TRANSCRIBE ORDER (OUTPATIENT)
Dept: RADIATION THERAPY | Age: 72
End: 2022-12-16

## 2022-12-16 DIAGNOSIS — C61 MALIGNANT NEOPLASM OF PROSTATE (HCC): Primary | ICD-10-CM

## 2022-12-22 ENCOUNTER — TRANSCRIBE ORDER (OUTPATIENT)
Dept: RADIATION THERAPY | Age: 72
End: 2022-12-22

## 2022-12-22 DIAGNOSIS — C61 MALIGNANT NEOPLASM OF PROSTATE (HCC): Primary | ICD-10-CM

## 2022-12-22 DIAGNOSIS — E11.9 TYPE 2 DIABETES MELLITUS WITHOUT COMPLICATION, UNSPECIFIED WHETHER LONG TERM INSULIN USE (HCC): ICD-10-CM

## 2022-12-22 NOTE — PERIOP NOTES
PRE-SURGICAL INSTRUCTIONS        Patient's Name:  Maureen Corona      UNFOC'S Date:  12/22/2022            Covid Testing Date and Time:  vaccinated     Surgery Date:  1/5/2023                Do NOT eat or drink anything, including candy, gum, or ice chips after midnight on 1/5, unless you have specific instructions from your surgeon or anesthesia provider to do so. You may brush your teeth before coming to the hospital.  No smoking 24 hours prior to the day of surgery. No alcohol 24 hours prior to the day of surgery. No recreational drugs for one week prior to the day of surgery. Leave all valuables, including money/purse, at home. Remove all jewelry, nail polish, acrylic nails, and makeup (including mascara); no lotions powders, deodorant, or perfume/cologne/after shave on the skin. Follow instruction for Hibiclens washes and CHG wipes from surgeon's office. Glasses/contact lenses and dentures may be worn to the hospital.  They will be removed prior to surgery. Call your doctor if symptoms of a cold or illness develop within 24-48 hours prior to your surgery. 11.  If you are having an outpatient procedure, please make arrangements for a responsible ADULT TO [address] [place] [address] and stay with you for 24 hours after your surgery. 12. ONE VISITOR in the hospital at this time for outpatient procedures. Exceptions may be made for surgical admissions, per nursing unit guidelines      Special Instructions:      Bring list of CURRENT medications. Bring any pertinent legal medical records. Take these medications the morning of surgery with a sip of water:  as directed by MD   Follow physician instructions about insulin. Follow physician instructions about stopping anticoagulants. On the day of surgery, come in the main entrance of [facility]. Let the  at the desk know you are there for surgery.   A staff member will come escort you to the surgical area on the second floor.    If you have any questions or concerns, please do not hesitate to call:     (Prior to the day of surgery) PAT department:  [phone]   (Day of surgery) Pre-Op department:  [phone]    These surgical instructions were reviewed with Jessica Reddy during the PAT phone call.

## 2022-12-23 ENCOUNTER — HOSPITAL ENCOUNTER (OUTPATIENT)
Dept: PREADMISSION TESTING | Age: 72
End: 2022-12-23
Payer: MEDICARE

## 2022-12-23 DIAGNOSIS — E11.9 TYPE 2 DIABETES MELLITUS WITHOUT COMPLICATION, UNSPECIFIED WHETHER LONG TERM INSULIN USE (HCC): ICD-10-CM

## 2022-12-23 DIAGNOSIS — C61 MALIGNANT NEOPLASM OF PROSTATE (HCC): ICD-10-CM

## 2022-12-23 LAB
ANION GAP SERPL CALC-SCNC: 5 MMOL/L (ref 3–18)
APPEARANCE UR: CLEAR
ATRIAL RATE: 61 BPM
BACTERIA URNS QL MICRO: NEGATIVE /HPF
BASOPHILS # BLD: 0 K/UL (ref 0–0.1)
BASOPHILS NFR BLD: 0 % (ref 0–2)
BILIRUB UR QL: NEGATIVE
BUN SERPL-MCNC: 27 MG/DL (ref 7–18)
BUN/CREAT SERPL: 19 (ref 12–20)
CALCIUM SERPL-MCNC: 9.3 MG/DL (ref 8.5–10.1)
CALCULATED P AXIS, ECG09: 63 DEGREES
CALCULATED R AXIS, ECG10: 9 DEGREES
CALCULATED T AXIS, ECG11: 44 DEGREES
CHLORIDE SERPL-SCNC: 106 MMOL/L (ref 100–111)
CO2 SERPL-SCNC: 27 MMOL/L (ref 21–32)
COLOR UR: YELLOW
CREAT SERPL-MCNC: 1.42 MG/DL (ref 0.6–1.3)
DIAGNOSIS, 93000: NORMAL
DIFFERENTIAL METHOD BLD: ABNORMAL
EOSINOPHIL # BLD: 0.2 K/UL (ref 0–0.4)
EOSINOPHIL NFR BLD: 3 % (ref 0–5)
EPITH CASTS URNS QL MICRO: NEGATIVE /LPF (ref 0–5)
ERYTHROCYTE [DISTWIDTH] IN BLOOD BY AUTOMATED COUNT: 13.1 % (ref 11.6–14.5)
EST. AVERAGE GLUCOSE BLD GHB EST-MCNC: 143 MG/DL
GLUCOSE SERPL-MCNC: 104 MG/DL (ref 74–99)
GLUCOSE UR STRIP.AUTO-MCNC: NEGATIVE MG/DL
HBA1C MFR BLD: 6.6 % (ref 4.2–5.6)
HCT VFR BLD AUTO: 33.1 % (ref 36–48)
HGB BLD-MCNC: 10.5 G/DL (ref 13–16)
HGB UR QL STRIP: NEGATIVE
IMM GRANULOCYTES # BLD AUTO: 0 K/UL (ref 0–0.04)
IMM GRANULOCYTES NFR BLD AUTO: 0 % (ref 0–0.5)
KETONES UR QL STRIP.AUTO: NEGATIVE MG/DL
LEUKOCYTE ESTERASE UR QL STRIP.AUTO: NEGATIVE
LYMPHOCYTES # BLD: 2 K/UL (ref 0.9–3.6)
LYMPHOCYTES NFR BLD: 41 % (ref 21–52)
MCH RBC QN AUTO: 28.8 PG (ref 24–34)
MCHC RBC AUTO-ENTMCNC: 31.7 G/DL (ref 31–37)
MCV RBC AUTO: 90.7 FL (ref 78–100)
MONOCYTES # BLD: 0.5 K/UL (ref 0.05–1.2)
MONOCYTES NFR BLD: 9 % (ref 3–10)
NEUTS SEG # BLD: 2.2 K/UL (ref 1.8–8)
NEUTS SEG NFR BLD: 47 % (ref 40–73)
NITRITE UR QL STRIP.AUTO: NEGATIVE
NRBC # BLD: 0 K/UL (ref 0–0.01)
NRBC BLD-RTO: 0 PER 100 WBC
P-R INTERVAL, ECG05: 182 MS
PH UR STRIP: 6 [PH] (ref 5–8)
PLATELET # BLD AUTO: 181 K/UL (ref 135–420)
PMV BLD AUTO: 11.9 FL (ref 9.2–11.8)
POTASSIUM SERPL-SCNC: 5.4 MMOL/L (ref 3.5–5.5)
PROT UR STRIP-MCNC: NEGATIVE MG/DL
PSA SERPL-MCNC: 7.3 NG/ML (ref 0–4)
Q-T INTERVAL, ECG07: 386 MS
QRS DURATION, ECG06: 92 MS
QTC CALCULATION (BEZET), ECG08: 388 MS
RBC # BLD AUTO: 3.65 M/UL (ref 4.35–5.65)
RBC #/AREA URNS HPF: NORMAL /HPF (ref 0–5)
SODIUM SERPL-SCNC: 138 MMOL/L (ref 136–145)
SP GR UR REFRACTOMETRY: 1.01 (ref 1–1.03)
UROBILINOGEN UR QL STRIP.AUTO: 0.2 EU/DL (ref 0.2–1)
VENTRICULAR RATE, ECG03: 61 BPM
WBC # BLD AUTO: 4.8 K/UL (ref 4.6–13.2)
WBC URNS QL MICRO: NORMAL /HPF (ref 0–4)

## 2022-12-23 PROCEDURE — 83036 HEMOGLOBIN GLYCOSYLATED A1C: CPT

## 2022-12-23 PROCEDURE — 93005 ELECTROCARDIOGRAM TRACING: CPT

## 2022-12-23 PROCEDURE — 80048 BASIC METABOLIC PNL TOTAL CA: CPT

## 2022-12-23 PROCEDURE — 84153 ASSAY OF PSA TOTAL: CPT

## 2022-12-23 PROCEDURE — 81001 URINALYSIS AUTO W/SCOPE: CPT

## 2022-12-23 PROCEDURE — 36415 COLL VENOUS BLD VENIPUNCTURE: CPT

## 2022-12-23 PROCEDURE — 85025 COMPLETE CBC W/AUTO DIFF WBC: CPT

## 2022-12-23 PROCEDURE — 87086 URINE CULTURE/COLONY COUNT: CPT

## 2022-12-23 PROCEDURE — 84403 ASSAY OF TOTAL TESTOSTERONE: CPT

## 2022-12-24 LAB
BACTERIA SPEC CULT: NORMAL
SERVICE CMNT-IMP: NORMAL
TESTOST SERPL-MCNC: 262 NG/DL (ref 264–916)

## 2022-12-29 ENCOUNTER — TRANSCRIBE ORDER (OUTPATIENT)
Dept: SCHEDULING | Age: 72
End: 2022-12-29

## 2022-12-29 DIAGNOSIS — C61 MALIGNANT NEOPLASM OF PROSTATE (HCC): Primary | ICD-10-CM

## 2023-01-04 ENCOUNTER — ANESTHESIA EVENT (OUTPATIENT)
Dept: SURGERY | Age: 73
End: 2023-01-04
Payer: MEDICARE

## 2023-01-04 NOTE — H&P
H&P    Patient: Edson Gomes        YOB: 1950  Patient ID: [id number]       Referring MD:  Danny Catherine MD  Radiation Oncologist: Gumaro Mccullough MD  Patient Diagnosis:  C61 - Malignant neoplasm of prostate, Diagnosed 9/8/2022 (Active) Stage IIB, T1c, N0, M0, P<10, G2  AJCC Staging has been reviewed. IDENTIFICATION:   73year old male with intermediate risk prostate cancer (cT1c, GS 3+4, GS 3+3, 6/12 cores positive, volume 59 cc, PSA 6.35) referred by Dr. Michelle Gray for radiotherapy management of disease. HISTORY OF PRESENT ILLNESS: Mr. Yanira Travis is a 73year old male with newly diagnosed intermediate risk prostate cancer. He underwent TRUS biopsy due to elevated PSA of 6.35 as of 7/2022. TRUS biopsy was performed on 9/8/22 with pathology revealing GS 3+4 in 1/12 cores, GS 3+3 in 5/12 cores, total 6/12 cores positive, and volume 50 cc. Mr. Yanira Travis is referred by Dr. Michelle Gray for radiotherapy consultation. Mr. Yanira Travis has discussed surgery with Dr. Michelle Gray and will return to Dr. Michelle Gray to finalize a treatment plan. The oncologic history is as follows:   9/8/22: TRUS biopsy revealed prostatic adenocarcinoma, GS 3+4 in right lateral apex, GS 3+3 in right mid, right lateral mid, left apex, left base, left lateral base, 6/12 cores positive, volume 59 cc. Mr. Yanira Travis reports nocturia x1. He denies urinary urgency, frequency, hematuria, dysuria, weak stream, incomplete emptying, diarrhea, bloody stools, and pain. There is no personal history of collagen vascular disorder, pacemaker, ulcerative colitis, Crohn's disease, or prior radiotherapy. Family history is unremarkable for cancers.      Baseline:  PSA: 6.35  IPSS: 1  UQOL: 2  IIEF5: 17  EPICCP: 7    PSA History:  7/2022 - 6.35  5/2022 - 5.2  8/2018 - 2.5  2/2017 - 2.1  10/2015 - 2.1  1/2010 - 0.9    STAGING:   C61 - Malignant neoplasm of prostate, Diagnosed 9/8/2022 (Active) Stage IIB, T1c, N0, M0, P<10, G2  PATHOLOGY:  C61 - Malignant neoplasm of prostate  Cell Histology Category: Adenocarcinoma; Type: Adenocarcinoma, NOS; Grade II - Moderately differentiated   Rafa Score Predominant/Major: 3; Secondary/Minor: 4; Total: 7   Core Assessment Taken: 12; Positive: 6     Medical History:  - CAD,  - cKD,  - cOPD,  - diabetes,  - gout,  - heart disease,  - hypercholesterolemia,  - hypertension. Surgical History: Back surgery, cervical spine surgery, colonoscopy, mOHS procedures and tRUS biopsy on 2022. Allergies:   No Known Allergies    Current Medications: Aldactone 25 mg (of 25 mg) Tablet Oral   Allopurinol 100 mg (of 100 mg) Tablet Oral daily   Carvedilol 25 mg (of 25 mg) Tablet Oral daily   Chlorthalidone 50 mg (of 50 mg) Tablet Oral   Dicyclomine HCl 20 mg (of 20 mg) Capsule Oral b.i.d. Flonase (50 mcg/act) Suspension Nasal   Gabapentin 400 mg (of 400 mg) Capsule Oral   Glucophage 1,000 mg (of 1000 mg) Tablet Oral b.i.d. Lantus 55 Units (of 100 Units/mL) Subcutaneous at bedtime   NovoLIN 70/30 Subcutaneous   Omeprazole 40 mg (of 40 mg) Capsule Delayed Release Oral daily   Simvastatin 40 mg (of 40 mg) Tablet Oral daily    PQRS MEDICATION RECONCILIATION:  Medications documented and reviewed    Family History: Father is  having experienced hypertension. Mother is . Brother is alive. Brother is . Sister is alive. Sister is . Social History: Last screened on 10/28/2022 - Never smoked. Last screened on 10/28/2022 - Never drank. Patient indicated access to the following support systems: Adequate transportation available for expected visits, Lives with spouse, significant other, family, or friends, and Supportive family/friends willing to assist with needs. Patient indicated the following nutritional habits: Regular meals. Patient indicated participation in the following forms of activity: Daily activities, Sedentary, and Sexually inactive. education: 12  employment: .     Review of Systems: Constitutional Denies lack of appetite, fatigue, fever, lethargy, malaise, night sweats, rigors / chills and change in weight. Allergic/Immunologic Denies allergies and adverse reactions. ENMT Denies dysphagia, ear pain, epistaxis, esophagitis, problems with hearing, mouth dryness, oral bleeding, otitis, sinusitis, sputum production, stomatitis, altered taste and tinnitus. Cardiovascular Denies arrhythmias, chest pain, dyspnea, edema, orthopnea and palpitations. Respiratory Denies cough, dyspnea, hemoptysis, hiccoughs, pleuritic chest pain and wheezing. Gastrointestinal Denies abdominal pain, change in bowel habits, constipation, diarrhea, heartburn / dyspepsia, hematemesis, hematochezia, hemorrhoids, melena / GI bleeding, nausea, pain / cramping, satiety and vomiting. ROS Genitourinary (M) Complains of urinary frequency that was noted within the last several weeks associated with urgency which is managed by frequent timed voiding and this condition is continuing. Complains of nocturia 1-3 times/night that began within the last several months associated with frequency which is managed by frequent timed voiding and this condition is continuing 1x. Denies dysuria, hematuria, impotence, incontinence, renal stone disease, retrograde ejaculation, scrotal swelling, urgency and urine color change. Musculoskeletal Denies arthritis, bone pain, joint pain, muscle weakness and decreased range of motion. Neurologic Denies disorientation, dizziness, abnormal gait, headaches, insomnia, memory loss, motor weakness, sensory problems, paralysis, seizure and stroke. Psychiatric Denies delusions, hallucinations, mood swings, depression and euphoria. Endocrine Complains of Type 2 diabetes which is controlled with medication. Denies hot flashes and thyroid disease. PERFORMANCE STATUS: 0 - Fully active, able to carry on all predisease activities without restrictions.  (ECOG)    Vitals: Performed on 10/31/2022 1:47 PM   BMI - 33.153 kg/m2 (HIGH) -    Height - 69 in -    Weight - 224.5 lbs -    Temperature - 98.5 F -    Pulse - 80 /min -    Respiration - 16 /min -    O2 Sat - 98 % -    Pain - 0 -    Fatigue - 0 -    Fall Risk - n -    BP - 160/ 67 mm(hg)(HIGH/) - ;    Physical Exam:    Constitutional No evidence of impaired alertness, inadequate appearance, premature or advanced chronologic age, uncooperativeness, developmental delays, altered mood and affect and disorientation. Head No evidence of abnormal cephalic and scars. Eyes No evidence of conjunctivitis, nonreactive pupil(s) and scleral abnormalities. Neck No evidence of tender or enlarged lymph nodes, neck abnormalities, restricted range of motion and enlarged thyroid gland. Integumentary No evidence of blistering, bruising, dry skin, erythema, nails changes, altered pigmentation, rash and urticaria. Chest No evidence of chest abnormalities and tender or enlarged lymph nodes. Cardiovascular No evidence of abnormal heart rate, heart arrhythmia and abnormal heart sounds. Respiratory No evidence of abnormal breath sounds. Abdomen No evidence of abdominal abnormalities, abnormal bowel sounds, hepatomegaly and splenomegaly. Extremities No evidence of lower extremities abnormalities and upper extremities abnormalities. Back/Spine No evidence of reduced flexibility and abnormal spinal curvature. Musculoskeletal No evidence of bone abnormalities, joint abnormalities, compromised muscle tone and restricted range of motion. Neurologic No evidence of uncoordinated gait. Psychiatric No evidence of altered affect, lack of comprehension and disorientation. Hematologic/Lymphatic No evidence of tender or enlarged lymph nodes and petechiae / purpura / ecchymosis.      Impression:   73year old male with intermediate risk prostate cancer (cT1c, GS 3+4, GS 3+3, 6/12 cores positive, volume 59 cc, PSA 6.35) referred by Dr. Kimmie Grewal for radiotherapy management of disease. Plan:   I discussed risk grouping of prostate cancer with Mr. Margaret Garcia, who was accompanied by his wife, and explained that his disease is considered intermediate risk. We discussed surgery and radiotherapy. Mr. Margaret Garcia wishes to avoid surgery. I discussed the planning process of radiotherapy, including CT simulation and MRI pelvis. I discussed the importance of a comfortably full bladder and reproducibly empty rectum as well as bowel management for IMRT. I thoroughly explained the potential acute and late side effects of IMRT/IGRT including (but not limited to) urinary symptoms (frequency, urgency, nocturia, hematuria, dysuria, incontinence), fatigue, osteopenia, erectile dysfunction, dry ejaculate, infertility, diarrhea, GI bleed, bladder inflammation/injury, bowel inflammation/injury that could require surgery, rectal injury that could require surgery, and skin changes/pain/desquamation. I explained the use of fiducial markers and SpaceOAR gel for targeting and reducing the dose to the rectum, respectively. I then answered all of the patient's questions regarding proton therapy, and explained that there is no evidence in the literature that shows proton therapy as a superior treatment modality in comparison to photon radiation treatment for prostate cancer specifically, with regard to either long-term disease control rates or toxicity. Mr. Margaret Garcia expressed an understanding of the above and a desire to think over his options and meet with the [facility] prior to finalizing a treatment decision. Should he elect to receive radiotherapy at our clinic, he will call and return, and the plan would be as follows -   1. Fiducial marker and spaceoar placement at [facility].   2. At least 1 week later, CT simulation and MRI pelvis for planning. 3. 7920 cGy in 44 fractions versus 7000 cGy in 28 fractions to treat the prostate and seminal vesicles.  Final dose pending evaluation of urinary symptoms and prostate volume, should he elect for treatment at our clinic. Mr. Ban Chirinos will present on 1/5/22 for fiducial marker and SpaceOAR placement. Potential risks and benefits discussed and questions were answered.  Michelle Fabricio expressed an understanding and a desire to proceed. CT simulation and MRI pelvis for planning to follow.

## 2023-01-05 ENCOUNTER — ANESTHESIA (OUTPATIENT)
Dept: SURGERY | Age: 73
End: 2023-01-05
Payer: MEDICARE

## 2023-01-05 ENCOUNTER — HOSPITAL ENCOUNTER (OUTPATIENT)
Age: 73
Setting detail: OUTPATIENT SURGERY
Discharge: HOME | End: 2023-01-05
Attending: RADIOLOGY | Admitting: RADIOLOGY
Payer: MEDICARE

## 2023-01-05 ENCOUNTER — HOSPITAL ENCOUNTER (OUTPATIENT)
Dept: RADIATION THERAPY | Age: 73
Discharge: HOME | End: 2023-01-05
Payer: MEDICARE

## 2023-01-05 VITALS
OXYGEN SATURATION: 97 % | TEMPERATURE: 97.1 F | RESPIRATION RATE: 20 BRPM | SYSTOLIC BLOOD PRESSURE: 140 MMHG | BODY MASS INDEX: 30.95 KG/M2 | DIASTOLIC BLOOD PRESSURE: 73 MMHG | HEART RATE: 68 BPM | HEIGHT: 70 IN | WEIGHT: 216.2 LBS

## 2023-01-05 LAB
GLUCOSE BLD STRIP.AUTO-MCNC: 101 MG/DL (ref 70–110)
GLUCOSE BLD STRIP.AUTO-MCNC: 89 MG/DL (ref 70–110)

## 2023-01-05 PROCEDURE — 76060000032 HC ANESTHESIA 0.5 TO 1 HR: Performed by: RADIOLOGY

## 2023-01-05 PROCEDURE — 76942 ECHO GUIDE FOR BIOPSY: CPT

## 2023-01-05 PROCEDURE — 76210000006 HC OR PH I REC 0.5 TO 1 HR: Performed by: RADIOLOGY

## 2023-01-05 PROCEDURE — 55876 PLACE RT DEVICE/MARKER PROS: CPT | Performed by: RADIOLOGY

## 2023-01-05 PROCEDURE — 2709999900 HC NON-CHARGEABLE SUPPLY: Performed by: RADIOLOGY

## 2023-01-05 PROCEDURE — A4648 IMPLANTABLE TISSUE MARKER: HCPCS | Performed by: RADIOLOGY

## 2023-01-05 PROCEDURE — 76010000138 HC OR TIME 0.5 TO 1 HR: Performed by: RADIOLOGY

## 2023-01-05 PROCEDURE — 77030015736 HC BLLN ENDOCAV CIVC -B: Performed by: RADIOLOGY

## 2023-01-05 PROCEDURE — 74011250636 HC RX REV CODE- 250/636: Performed by: ANESTHESIOLOGY

## 2023-01-05 PROCEDURE — 74011250637 HC RX REV CODE- 250/637: Performed by: NURSE ANESTHETIST, CERTIFIED REGISTERED

## 2023-01-05 PROCEDURE — 82962 GLUCOSE BLOOD TEST: CPT

## 2023-01-05 PROCEDURE — 74011250636 HC RX REV CODE- 250/636: Performed by: NURSE ANESTHETIST, CERTIFIED REGISTERED

## 2023-01-05 PROCEDURE — 76210000021 HC REC RM PH II 0.5 TO 1 HR: Performed by: RADIOLOGY

## 2023-01-05 PROCEDURE — 74011000250 HC RX REV CODE- 250: Performed by: ANESTHESIOLOGY

## 2023-01-05 RX ORDER — EPHEDRINE SULFATE/0.9% NACL/PF 50 MG/5 ML
SYRINGE (ML) INTRAVENOUS AS NEEDED
Status: DISCONTINUED | OUTPATIENT
Start: 2023-01-05 | End: 2023-01-05 | Stop reason: HOSPADM

## 2023-01-05 RX ORDER — FAMOTIDINE 20 MG/1
20 TABLET, FILM COATED ORAL ONCE
Status: COMPLETED | OUTPATIENT
Start: 2023-01-05 | End: 2023-01-05

## 2023-01-05 RX ORDER — SODIUM CHLORIDE 0.9 % (FLUSH) 0.9 %
5-40 SYRINGE (ML) INJECTION AS NEEDED
Status: DISCONTINUED | OUTPATIENT
Start: 2023-01-05 | End: 2023-01-05 | Stop reason: HOSPADM

## 2023-01-05 RX ORDER — PROPOFOL 10 MG/ML
INJECTION, EMULSION INTRAVENOUS AS NEEDED
Status: DISCONTINUED | OUTPATIENT
Start: 2023-01-05 | End: 2023-01-05 | Stop reason: HOSPADM

## 2023-01-05 RX ORDER — MIDAZOLAM HYDROCHLORIDE 1 MG/ML
INJECTION, SOLUTION INTRAMUSCULAR; INTRAVENOUS AS NEEDED
Status: DISCONTINUED | OUTPATIENT
Start: 2023-01-05 | End: 2023-01-05 | Stop reason: HOSPADM

## 2023-01-05 RX ORDER — SODIUM CHLORIDE 0.9 % (FLUSH) 0.9 %
5-40 SYRINGE (ML) INJECTION EVERY 8 HOURS
Status: DISCONTINUED | OUTPATIENT
Start: 2023-01-05 | End: 2023-01-05 | Stop reason: HOSPADM

## 2023-01-05 RX ORDER — LIDOCAINE HYDROCHLORIDE 10 MG/ML
0.1 INJECTION, SOLUTION EPIDURAL; INFILTRATION; INTRACAUDAL; PERINEURAL AS NEEDED
Status: DISCONTINUED | OUTPATIENT
Start: 2023-01-05 | End: 2023-01-05 | Stop reason: HOSPADM

## 2023-01-05 RX ORDER — FENTANYL CITRATE 50 UG/ML
INJECTION, SOLUTION INTRAMUSCULAR; INTRAVENOUS AS NEEDED
Status: DISCONTINUED | OUTPATIENT
Start: 2023-01-05 | End: 2023-01-05 | Stop reason: HOSPADM

## 2023-01-05 RX ORDER — ROCURONIUM BROMIDE 10 MG/ML
INJECTION, SOLUTION INTRAVENOUS AS NEEDED
Status: DISCONTINUED | OUTPATIENT
Start: 2023-01-05 | End: 2023-01-05 | Stop reason: HOSPADM

## 2023-01-05 RX ORDER — SODIUM CHLORIDE, SODIUM LACTATE, POTASSIUM CHLORIDE, CALCIUM CHLORIDE 600; 310; 30; 20 MG/100ML; MG/100ML; MG/100ML; MG/100ML
INJECTION, SOLUTION INTRAVENOUS
Status: DISCONTINUED | OUTPATIENT
Start: 2023-01-05 | End: 2023-01-05 | Stop reason: HOSPADM

## 2023-01-05 RX ORDER — CEFAZOLIN SODIUM 1 G/3ML
INJECTION, POWDER, FOR SOLUTION INTRAMUSCULAR; INTRAVENOUS AS NEEDED
Status: DISCONTINUED | OUTPATIENT
Start: 2023-01-05 | End: 2023-01-05 | Stop reason: HOSPADM

## 2023-01-05 RX ORDER — INSULIN LISPRO 100 [IU]/ML
INJECTION, SOLUTION INTRAVENOUS; SUBCUTANEOUS ONCE
Status: DISCONTINUED | OUTPATIENT
Start: 2023-01-05 | End: 2023-01-05 | Stop reason: HOSPADM

## 2023-01-05 RX ORDER — SODIUM CHLORIDE 9 MG/ML
25 INJECTION, SOLUTION INTRAVENOUS CONTINUOUS
Status: DISCONTINUED | OUTPATIENT
Start: 2023-01-05 | End: 2023-01-05 | Stop reason: HOSPADM

## 2023-01-05 RX ORDER — FENTANYL CITRATE 50 UG/ML
50 INJECTION, SOLUTION INTRAMUSCULAR; INTRAVENOUS
Status: DISCONTINUED | OUTPATIENT
Start: 2023-01-05 | End: 2023-01-05 | Stop reason: HOSPADM

## 2023-01-05 RX ORDER — SODIUM CHLORIDE, SODIUM LACTATE, POTASSIUM CHLORIDE, CALCIUM CHLORIDE 600; 310; 30; 20 MG/100ML; MG/100ML; MG/100ML; MG/100ML
100 INJECTION, SOLUTION INTRAVENOUS CONTINUOUS
Status: DISCONTINUED | OUTPATIENT
Start: 2023-01-05 | End: 2023-01-05 | Stop reason: HOSPADM

## 2023-01-05 RX ADMIN — FAMOTIDINE 20 MG: 20 TABLET, FILM COATED ORAL at 07:47

## 2023-01-05 RX ADMIN — ROCURONIUM BROMIDE 10 MG: 50 INJECTION INTRAVENOUS at 09:05

## 2023-01-05 RX ADMIN — SODIUM CHLORIDE 25 ML/HR: 9 INJECTION, SOLUTION INTRAVENOUS at 07:46

## 2023-01-05 RX ADMIN — MIDAZOLAM HYDROCHLORIDE 2 MG: 2 INJECTION, SOLUTION INTRAMUSCULAR; INTRAVENOUS at 08:52

## 2023-01-05 RX ADMIN — Medication 10 MG: at 09:14

## 2023-01-05 RX ADMIN — PROPOFOL 150 MG: 10 INJECTION, EMULSION INTRAVENOUS at 09:03

## 2023-01-05 RX ADMIN — FENTANYL CITRATE 50 MCG: 50 INJECTION, SOLUTION INTRAMUSCULAR; INTRAVENOUS at 09:03

## 2023-01-05 RX ADMIN — CEFAZOLIN SODIUM 2 G: 1 INJECTION, POWDER, FOR SOLUTION INTRAMUSCULAR; INTRAVENOUS at 09:05

## 2023-01-05 RX ADMIN — FENTANYL CITRATE 50 MCG: 50 INJECTION, SOLUTION INTRAMUSCULAR; INTRAVENOUS at 09:40

## 2023-01-05 RX ADMIN — SODIUM CHLORIDE, SODIUM LACTATE, POTASSIUM CHLORIDE, AND CALCIUM CHLORIDE: 600; 310; 30; 20 INJECTION, SOLUTION INTRAVENOUS at 08:54

## 2023-01-05 NOTE — INTERVAL H&P NOTE
Update History & Physical    The Patient's History and Physical of January 5, 2023 was reviewed with the patient and I examined the patient. There was no change. The surgical site was confirmed by the patient and me. Plan:  The risk, benefits, expected outcome, and alternative to the recommended procedure have been discussed with the patient. Patient understands and wants to proceed with the procedure.     Electronically signed by Gurjit Castro MD on 1/5/2023 at 8:40 AM

## 2023-01-05 NOTE — OP NOTES
Operative Note    Patient: Lakshmi Almeida  YOB: 1950  MRN: [id number]    Date of Procedure: 1/5/2023     Pre-Op Diagnosis: Prostate cancer ([address] [place]    Post-Op Diagnosis: Same as preoperative diagnosis. Procedure(s):  TRANSRECTAL ULTRASOUND GUIDED FIDUCIAL MARKER PLACEMENT    Surgeon(s):  Seamus Rodríguez MD    Surgical Assistant: None    Anesthesia: General     Estimated Blood Loss (mL):  Minimal    Complications: None    Specimens: * No specimens in log *     Implants: * No implants in log *    Drains: * No LDAs found *    Findings: No specimens removed. Unable to place hydrogel. Detailed Description of Procedure:   Mr. Cornelia Mendoza was brought to [facility]. He was then positioned on the distal end of the ultrasound table in the dorsal lithotomy position with his legs in [address] [place] [address] and his hips slightly hyperflexed. Care was taken to align the patient straight on the table. The perineum was then prepped with HIBICLENS. The Biplanar multi-frequency B & K transrectal ultrasound transducer was then carefully introduced into the rectum and attached to the digital, electronic stepper unit which was mounted to the stabilizer unit. The gland was evaluated on both transverse and longitudinal imaging. Three 18G steel trocar needles with 60 degree bevels pre-loaded with single fiducial markers each spaced 2cm were then placed in turn under direct transrectal ultrasound guidance into the right and then left lobes of the prostate. The markers on the right were placed such that the cephalic extent was near the right base. The marker on the left was implanted in the same fashion such that the caudal extent was near the left apex. The position of all three markers was verified on ultrasound imaging and deemed to be satisfactory. Next, a 6 inch, 18G spinal needle was then placed in the posterior midline and advanced to the recto-prostatic space near midgland.  1 cc of saline was injected to confirm the presence of a good tissue plane. Image quality was unsatisfactory and despite efforts to improve image quality, fat plane was not well identified and the decision was made to abort the hydrogel placement procedure. At the completion of the procedure, which was tolerated well, the perineum was cleaned, and the puncture sites were covered with an antibiotic dressing. He was discharged in good condition. He was given a prescription for Ciprofloxacin 500 mg, BID x 3 days and Naproxen, with instructions to begin tomorrow. He was given an appointment to return for CT based simulation and treatment planning. The IMRT/IGRT phase of treatment will then follow soon thereafter.        Electronically Signed by Rea Estrada MD on 1/5/2023 at 9:31 AM

## 2023-01-05 NOTE — DISCHARGE INSTRUCTIONS
DISCHARGE SUMMARY from Nurse    PATIENT INSTRUCTIONS:    After general anesthesia or intravenous sedation, for 24 hours or while taking prescription Narcotics:  Limit your activities  Do not drive and operate hazardous machinery  Do not make important personal or business decisions  Do  not drink alcoholic beverages  If you have not urinated within 8 hours after discharge, please contact your surgeon on call. Report the following to your surgeon:  Excessive pain, swelling, redness or odor of or around the surgical area  Temperature over 100.5  Nausea and vomiting lasting longer than 4 hours or if unable to take medications  Any signs of decreased circulation or nerve impairment to extremity: change in color, persistent  numbness, tingling, coldness or increase pain  Any questions      These are general instructions for a healthy lifestyle:    No smoking/ No tobacco products/ Avoid exposure to second hand smoke  Surgeon General's Warning:  Quitting smoking now greatly reduces serious risk to your health. Obesity, smoking, and sedentary lifestyle greatly increases your risk for illness    A healthy diet, regular physical exercise & weight monitoring are important for maintaining a healthy lifestyle    You may be retaining fluid if you have a history of heart failure or if you experience any of the following symptoms:  Weight gain of 3 pounds or more overnight or 5 pounds in a week, increased swelling in our hands or feet or shortness of breath while lying flat in bed. Please call your doctor as soon as you notice any of these symptoms; do not wait until your next office visit. The discharge information has been reviewed with the patient and friend. The patient and friend verbalized understanding.   Discharge medications reviewed with the patient and friend and appropriate educational materials and side effects teaching were provided.   ___________________________________________________________________________________________________________________________________

## 2023-01-05 NOTE — ANESTHESIA PREPROCEDURE EVALUATION
Relevant Problems   No relevant active problems       Anesthetic History   No history of anesthetic complications            Review of Systems / Medical History  Patient summary reviewed and pertinent labs reviewed    Pulmonary    COPD: moderate    Sleep apnea           Neuro/Psych   Within defined limits           Cardiovascular    Hypertension: well controlled          CAD    Exercise tolerance: >4 METS     GI/Hepatic/Renal     GERD           Endo/Other    Diabetes: well controlled, type 2    Cancer     Other Findings              Physical Exam    Airway  Mallampati: III  TM Distance: 4 - 6 cm  Neck ROM: decreased range of motion   Mouth opening: Normal     Cardiovascular    Rhythm: regular  Rate: normal         Dental    Dentition: Poor dentition     Pulmonary  Breath sounds clear to auscultation               Abdominal  GI exam deferred       Other Findings            Anesthetic Plan    ASA: 3  Anesthesia type: general          Induction: Intravenous  Anesthetic plan and risks discussed with: Patient

## 2023-01-05 NOTE — ANESTHESIA POSTPROCEDURE EVALUATION
Procedure(s):  TRANSRECTAL ULTRASOUND GUIDED FIDUCIAL MARKER PLACEMENT. general    Anesthesia Post Evaluation      Multimodal analgesia: multimodal analgesia used between 6 hours prior to anesthesia start to PACU discharge  Patient location during evaluation: PACU  Patient participation: complete - patient participated  Level of consciousness: sleepy but conscious  Pain management: adequate  Airway patency: patent  Anesthetic complications: no  Cardiovascular status: acceptable  Respiratory status: acceptable  Hydration status: acceptable  Post anesthesia nausea and vomiting:  controlled  Final Post Anesthesia Temperature Assessment:  Normothermia (36.0-37.5 degrees C)      INITIAL Post-op Vital signs:   Vitals Value Taken Time   /64 01/05/23 1028   Temp 36.4 °C (97.6 °F) 01/05/23 0941   Pulse 64 01/05/23 1032   Resp 13 01/05/23 1032   SpO2 99 % 01/05/23 1032   Vitals shown include unvalidated device data.

## 2023-01-17 ENCOUNTER — HOSPITAL ENCOUNTER (OUTPATIENT)
Dept: RADIATION THERAPY | Age: 73
Discharge: HOME | End: 2023-01-17
Payer: MEDICARE

## 2023-01-17 ENCOUNTER — TRANSCRIBE ORDER (OUTPATIENT)
Dept: RADIATION THERAPY | Age: 73
End: 2023-01-17

## 2023-01-17 ENCOUNTER — HOSPITAL ENCOUNTER (OUTPATIENT)
Age: 73
Discharge: HOME | End: 2023-01-17
Attending: RADIOLOGY
Payer: MEDICARE

## 2023-01-17 DIAGNOSIS — C61 MALIGNANT NEOPLASM OF PROSTATE (HCC): Primary | ICD-10-CM

## 2023-01-17 DIAGNOSIS — C61 MALIGNANT NEOPLASM OF PROSTATE (HCC): ICD-10-CM

## 2023-01-17 PROCEDURE — 77334 RADIATION TREATMENT AID(S): CPT

## 2023-01-18 ENCOUNTER — HOSPITAL ENCOUNTER (OUTPATIENT)
Dept: RADIATION THERAPY | Age: 73
Discharge: HOME | End: 2023-01-18
Payer: MEDICARE

## 2023-01-23 ENCOUNTER — HOSPITAL ENCOUNTER (OUTPATIENT)
Dept: RADIATION THERAPY | Age: 73
Discharge: HOME | End: 2023-01-23
Payer: MEDICARE

## 2023-01-23 PROCEDURE — 77338 DESIGN MLC DEVICE FOR IMRT: CPT

## 2023-01-23 PROCEDURE — 77300 RADIATION THERAPY DOSE PLAN: CPT

## 2023-01-23 PROCEDURE — 77301 RADIOTHERAPY DOSE PLAN IMRT: CPT

## 2023-01-24 ENCOUNTER — HOSPITAL ENCOUNTER (OUTPATIENT)
Dept: RADIATION THERAPY | Age: 73
Discharge: HOME | End: 2023-01-24
Payer: MEDICARE

## 2023-01-24 PROCEDURE — 77385 HC IMRT TRMT DLVR SMPL: CPT

## 2023-01-24 PROCEDURE — G6002 STEREOSCOPIC X-RAY GUIDANCE: HCPCS | Performed by: RADIOLOGY

## 2023-01-25 ENCOUNTER — HOSPITAL ENCOUNTER (OUTPATIENT)
Dept: RADIATION THERAPY | Age: 73
Discharge: HOME | End: 2023-01-25
Payer: MEDICARE

## 2023-01-25 PROCEDURE — 77385 HC IMRT TRMT DLVR SMPL: CPT

## 2023-01-25 PROCEDURE — G6002 STEREOSCOPIC X-RAY GUIDANCE: HCPCS | Performed by: RADIOLOGY

## 2023-01-26 ENCOUNTER — HOSPITAL ENCOUNTER (OUTPATIENT)
Dept: RADIATION THERAPY | Age: 73
Discharge: HOME | End: 2023-01-26
Payer: MEDICARE

## 2023-01-26 PROCEDURE — 77385 HC IMRT TRMT DLVR SMPL: CPT

## 2023-01-26 PROCEDURE — 77014 CHG CT GUIDANCE RADIATION THERAPY FLDS PLACEMENT: CPT | Performed by: RADIOLOGY

## 2023-01-27 ENCOUNTER — HOSPITAL ENCOUNTER (OUTPATIENT)
Dept: RADIATION THERAPY | Age: 73
Discharge: HOME | End: 2023-01-27
Payer: MEDICARE

## 2023-01-27 PROCEDURE — 77385 HC IMRT TRMT DLVR SMPL: CPT

## 2023-01-27 PROCEDURE — 77014 CHG CT GUIDANCE RADIATION THERAPY FLDS PLACEMENT: CPT | Performed by: RADIOLOGY

## 2023-01-30 ENCOUNTER — HOSPITAL ENCOUNTER (OUTPATIENT)
Dept: RADIATION THERAPY | Age: 73
Discharge: HOME | End: 2023-01-30
Payer: MEDICARE

## 2023-01-30 PROCEDURE — 77336 RADIATION PHYSICS CONSULT: CPT

## 2023-01-30 PROCEDURE — 77014 CHG CT GUIDANCE RADIATION THERAPY FLDS PLACEMENT: CPT | Performed by: RADIOLOGY

## 2023-01-30 PROCEDURE — 77385 HC IMRT TRMT DLVR SMPL: CPT

## 2023-01-31 ENCOUNTER — HOSPITAL ENCOUNTER (OUTPATIENT)
Dept: RADIATION THERAPY | Age: 73
Discharge: HOME | End: 2023-01-31
Payer: MEDICARE

## 2023-01-31 PROCEDURE — G6002 STEREOSCOPIC X-RAY GUIDANCE: HCPCS | Performed by: RADIOLOGY

## 2023-01-31 PROCEDURE — 77385 HC IMRT TRMT DLVR SMPL: CPT

## 2023-02-01 ENCOUNTER — HOSPITAL ENCOUNTER (OUTPATIENT)
Dept: RADIATION THERAPY | Age: 73
Discharge: HOME | End: 2023-02-01
Payer: MEDICARE

## 2023-02-01 PROCEDURE — 77385 HC IMRT TRMT DLVR SMPL: CPT

## 2023-02-02 ENCOUNTER — HOSPITAL ENCOUNTER (OUTPATIENT)
Dept: RADIATION THERAPY | Age: 73
Discharge: HOME | End: 2023-02-02
Payer: MEDICARE

## 2023-02-02 PROCEDURE — 77014 CHG CT GUIDANCE RADIATION THERAPY FLDS PLACEMENT: CPT | Performed by: RADIOLOGY

## 2023-02-02 PROCEDURE — 77385 HC IMRT TRMT DLVR SMPL: CPT

## 2023-02-03 ENCOUNTER — HOSPITAL ENCOUNTER (OUTPATIENT)
Dept: RADIATION THERAPY | Age: 73
Discharge: HOME | End: 2023-02-03
Payer: MEDICARE

## 2023-02-03 PROCEDURE — 77385 HC IMRT TRMT DLVR SMPL: CPT

## 2023-02-03 PROCEDURE — G6002 STEREOSCOPIC X-RAY GUIDANCE: HCPCS | Performed by: RADIOLOGY

## 2023-02-06 ENCOUNTER — HOSPITAL ENCOUNTER (OUTPATIENT)
Dept: RADIATION THERAPY | Age: 73
Discharge: HOME | End: 2023-02-06
Payer: MEDICARE

## 2023-02-06 PROCEDURE — 77336 RADIATION PHYSICS CONSULT: CPT

## 2023-02-06 PROCEDURE — 77385 HC IMRT TRMT DLVR SMPL: CPT

## 2023-02-06 PROCEDURE — 77014 CHG CT GUIDANCE RADIATION THERAPY FLDS PLACEMENT: CPT | Performed by: RADIOLOGY

## 2023-02-07 ENCOUNTER — HOSPITAL ENCOUNTER (OUTPATIENT)
Dept: RADIATION THERAPY | Age: 73
Discharge: HOME | End: 2023-02-07
Payer: MEDICARE

## 2023-02-07 PROCEDURE — 77427 RADIATION TX MANAGEMENT X5: CPT | Performed by: RADIOLOGY

## 2023-02-07 PROCEDURE — G6002 STEREOSCOPIC X-RAY GUIDANCE: HCPCS | Performed by: RADIOLOGY

## 2023-02-07 PROCEDURE — 77385 HC IMRT TRMT DLVR SMPL: CPT

## 2023-02-08 ENCOUNTER — HOSPITAL ENCOUNTER (OUTPATIENT)
Dept: RADIATION THERAPY | Age: 73
Discharge: HOME | End: 2023-02-08
Payer: MEDICARE

## 2023-02-08 PROCEDURE — 77014 CHG CT GUIDANCE RADIATION THERAPY FLDS PLACEMENT: CPT | Performed by: RADIOLOGY

## 2023-02-08 PROCEDURE — 77385 HC IMRT TRMT DLVR SMPL: CPT

## 2023-02-09 ENCOUNTER — HOSPITAL ENCOUNTER (OUTPATIENT)
Dept: RADIATION THERAPY | Age: 73
Discharge: HOME | End: 2023-02-09
Payer: MEDICARE

## 2023-02-09 PROCEDURE — 77385 HC IMRT TRMT DLVR SMPL: CPT

## 2023-02-09 PROCEDURE — G6002 STEREOSCOPIC X-RAY GUIDANCE: HCPCS | Performed by: RADIOLOGY

## 2023-02-10 ENCOUNTER — HOSPITAL ENCOUNTER (OUTPATIENT)
Dept: RADIATION THERAPY | Age: 73
Discharge: HOME | End: 2023-02-10
Payer: MEDICARE

## 2023-02-10 PROCEDURE — G6002 STEREOSCOPIC X-RAY GUIDANCE: HCPCS | Performed by: RADIOLOGY

## 2023-02-10 PROCEDURE — 77385 HC IMRT TRMT DLVR SMPL: CPT

## 2023-02-13 ENCOUNTER — APPOINTMENT (OUTPATIENT)
Dept: RADIATION THERAPY | Age: 73
End: 2023-02-13
Payer: MEDICARE

## 2023-02-13 ENCOUNTER — HOSPITAL ENCOUNTER (OUTPATIENT)
Facility: HOSPITAL | Age: 73
Setting detail: RECURRING SERIES
Discharge: HOME | End: 2023-02-16
Attending: RADIOLOGY
Payer: MEDICARE

## 2023-02-13 PROCEDURE — 77385 HC NTSTY MODUL RAD TX DLVR SMPL: CPT

## 2023-02-13 PROCEDURE — 77336 RADIATION PHYSICS CONSULT: CPT

## 2023-02-14 ENCOUNTER — APPOINTMENT (OUTPATIENT)
Dept: RADIATION THERAPY | Age: 73
End: 2023-02-14
Payer: MEDICARE

## 2023-02-14 ENCOUNTER — HOSPITAL ENCOUNTER (OUTPATIENT)
Facility: HOSPITAL | Age: 73
Setting detail: RECURRING SERIES
Discharge: HOME | End: 2023-02-17
Attending: RADIOLOGY
Payer: MEDICARE

## 2023-02-15 ENCOUNTER — HOSPITAL ENCOUNTER (OUTPATIENT)
Facility: HOSPITAL | Age: 73
Setting detail: RECURRING SERIES
Discharge: HOME | End: 2023-02-18
Attending: RADIOLOGY
Payer: MEDICARE

## 2023-02-15 ENCOUNTER — APPOINTMENT (OUTPATIENT)
Dept: RADIATION THERAPY | Age: 73
End: 2023-02-15
Payer: MEDICARE

## 2023-02-16 ENCOUNTER — APPOINTMENT (OUTPATIENT)
Dept: RADIATION THERAPY | Age: 73
End: 2023-02-16
Payer: MEDICARE

## 2023-02-16 ENCOUNTER — HOSPITAL ENCOUNTER (OUTPATIENT)
Facility: HOSPITAL | Age: 73
Setting detail: RECURRING SERIES
Discharge: HOME | End: 2023-02-19
Attending: RADIOLOGY
Payer: MEDICARE

## 2023-02-16 PROCEDURE — 77385 HC NTSTY MODUL RAD TX DLVR SMPL: CPT

## 2023-02-17 ENCOUNTER — HOSPITAL ENCOUNTER (OUTPATIENT)
Facility: HOSPITAL | Age: 73
Setting detail: RECURRING SERIES
Discharge: HOME | End: 2023-02-20
Attending: RADIOLOGY
Payer: MEDICARE

## 2023-02-17 ENCOUNTER — APPOINTMENT (OUTPATIENT)
Dept: RADIATION THERAPY | Age: 73
End: 2023-02-17
Payer: MEDICARE

## 2023-02-17 PROCEDURE — 77385 HC NTSTY MODUL RAD TX DLVR SMPL: CPT

## 2023-02-20 ENCOUNTER — APPOINTMENT (OUTPATIENT)
Dept: RADIATION THERAPY | Age: 73
End: 2023-02-20
Payer: MEDICARE

## 2023-02-20 ENCOUNTER — HOSPITAL ENCOUNTER (OUTPATIENT)
Facility: HOSPITAL | Age: 73
Setting detail: RECURRING SERIES
Discharge: HOME | End: 2023-02-23
Attending: RADIOLOGY
Payer: MEDICARE

## 2023-02-20 PROCEDURE — 77336 RADIATION PHYSICS CONSULT: CPT

## 2023-02-20 PROCEDURE — 77385 HC NTSTY MODUL RAD TX DLVR SMPL: CPT

## 2023-02-21 ENCOUNTER — APPOINTMENT (OUTPATIENT)
Dept: RADIATION THERAPY | Age: 73
End: 2023-02-21
Payer: MEDICARE

## 2023-02-21 ENCOUNTER — HOSPITAL ENCOUNTER (OUTPATIENT)
Facility: HOSPITAL | Age: 73
Setting detail: RECURRING SERIES
Discharge: HOME | End: 2023-02-24
Attending: RADIOLOGY
Payer: MEDICARE

## 2023-02-21 PROCEDURE — 77385 HC NTSTY MODUL RAD TX DLVR SMPL: CPT

## 2023-02-22 ENCOUNTER — APPOINTMENT (OUTPATIENT)
Dept: RADIATION THERAPY | Age: 73
End: 2023-02-22
Payer: MEDICARE

## 2023-02-22 ENCOUNTER — HOSPITAL ENCOUNTER (OUTPATIENT)
Facility: HOSPITAL | Age: 73
Setting detail: RECURRING SERIES
Discharge: HOME | End: 2023-02-25
Attending: RADIOLOGY
Payer: MEDICARE

## 2023-02-22 PROCEDURE — 77385 HC NTSTY MODUL RAD TX DLVR SMPL: CPT

## 2023-02-23 ENCOUNTER — APPOINTMENT (OUTPATIENT)
Dept: RADIATION THERAPY | Age: 73
End: 2023-02-23
Payer: MEDICARE

## 2023-02-23 ENCOUNTER — HOSPITAL ENCOUNTER (OUTPATIENT)
Facility: HOSPITAL | Age: 73
Setting detail: RECURRING SERIES
Discharge: HOME | End: 2023-02-26
Attending: RADIOLOGY
Payer: MEDICARE

## 2023-02-23 PROCEDURE — 77385 HC NTSTY MODUL RAD TX DLVR SMPL: CPT

## 2023-02-24 ENCOUNTER — APPOINTMENT (OUTPATIENT)
Dept: RADIATION THERAPY | Age: 73
End: 2023-02-24
Payer: MEDICARE

## 2023-02-24 ENCOUNTER — HOSPITAL ENCOUNTER (OUTPATIENT)
Facility: HOSPITAL | Age: 73
Setting detail: RECURRING SERIES
Discharge: HOME | End: 2023-02-27
Attending: RADIOLOGY
Payer: MEDICARE

## 2023-02-24 PROCEDURE — 77385 HC NTSTY MODUL RAD TX DLVR SMPL: CPT

## 2023-02-27 ENCOUNTER — APPOINTMENT (OUTPATIENT)
Dept: RADIATION THERAPY | Age: 73
End: 2023-02-27
Payer: MEDICARE

## 2023-02-27 ENCOUNTER — HOSPITAL ENCOUNTER (OUTPATIENT)
Facility: HOSPITAL | Age: 73
Setting detail: RECURRING SERIES
Discharge: HOME | End: 2023-03-02
Attending: RADIOLOGY
Payer: MEDICARE

## 2023-02-27 PROCEDURE — 77336 RADIATION PHYSICS CONSULT: CPT

## 2023-02-27 PROCEDURE — 77385 HC NTSTY MODUL RAD TX DLVR SMPL: CPT

## 2023-02-28 ENCOUNTER — APPOINTMENT (OUTPATIENT)
Dept: RADIATION THERAPY | Age: 73
End: 2023-02-28
Payer: MEDICARE

## 2023-02-28 ENCOUNTER — HOSPITAL ENCOUNTER (OUTPATIENT)
Facility: HOSPITAL | Age: 73
Setting detail: RECURRING SERIES
Discharge: HOME | End: 2023-03-03
Attending: RADIOLOGY
Payer: MEDICARE

## 2023-02-28 PROCEDURE — 77338 DESIGN MLC DEVICE FOR IMRT: CPT

## 2023-02-28 PROCEDURE — 77385 HC NTSTY MODUL RAD TX DLVR SMPL: CPT

## 2023-02-28 PROCEDURE — 77300 RADIATION THERAPY DOSE PLAN: CPT

## 2023-03-01 ENCOUNTER — APPOINTMENT (OUTPATIENT)
Dept: RADIATION THERAPY | Age: 73
End: 2023-03-01

## 2023-03-01 ENCOUNTER — HOSPITAL ENCOUNTER (OUTPATIENT)
Facility: HOSPITAL | Age: 73
Setting detail: RECURRING SERIES
Discharge: HOME | End: 2023-03-04
Attending: RADIOLOGY
Payer: MEDICARE

## 2023-03-01 ENCOUNTER — APPOINTMENT (OUTPATIENT)
Facility: HOSPITAL | Age: 73
End: 2023-03-01
Attending: RADIOLOGY
Payer: MEDICARE

## 2023-03-01 PROCEDURE — 77385 HC NTSTY MODUL RAD TX DLVR SMPL: CPT

## 2023-03-02 ENCOUNTER — APPOINTMENT (OUTPATIENT)
Dept: RADIATION THERAPY | Age: 73
End: 2023-03-02

## 2023-03-02 ENCOUNTER — HOSPITAL ENCOUNTER (OUTPATIENT)
Facility: HOSPITAL | Age: 73
Setting detail: RECURRING SERIES
Discharge: HOME | End: 2023-03-05
Attending: RADIOLOGY
Payer: MEDICARE

## 2023-03-02 ENCOUNTER — APPOINTMENT (OUTPATIENT)
Facility: HOSPITAL | Age: 73
End: 2023-03-02
Attending: RADIOLOGY
Payer: MEDICARE

## 2023-03-02 PROCEDURE — 77385 HC NTSTY MODUL RAD TX DLVR SMPL: CPT

## 2023-03-02 PROCEDURE — G6002 STEREOSCOPIC X-RAY GUIDANCE: HCPCS | Performed by: RADIOLOGY

## 2023-03-03 ENCOUNTER — APPOINTMENT (OUTPATIENT)
Dept: RADIATION THERAPY | Age: 73
End: 2023-03-03

## 2023-03-03 ENCOUNTER — APPOINTMENT (OUTPATIENT)
Facility: HOSPITAL | Age: 73
End: 2023-03-03
Attending: RADIOLOGY
Payer: MEDICARE

## 2023-03-03 ENCOUNTER — HOSPITAL ENCOUNTER (OUTPATIENT)
Facility: HOSPITAL | Age: 73
Setting detail: RECURRING SERIES
Discharge: HOME | End: 2023-03-06
Attending: RADIOLOGY
Payer: MEDICARE

## 2023-03-03 PROCEDURE — G6002 STEREOSCOPIC X-RAY GUIDANCE: HCPCS | Performed by: RADIOLOGY

## 2023-03-03 PROCEDURE — 77385 HC NTSTY MODUL RAD TX DLVR SMPL: CPT

## 2023-03-06 ENCOUNTER — APPOINTMENT (OUTPATIENT)
Facility: HOSPITAL | Age: 73
End: 2023-03-06
Attending: RADIOLOGY
Payer: MEDICARE

## 2023-03-06 ENCOUNTER — HOSPITAL ENCOUNTER (OUTPATIENT)
Facility: HOSPITAL | Age: 73
Setting detail: RECURRING SERIES
Discharge: HOME | End: 2023-03-09
Attending: RADIOLOGY
Payer: MEDICARE

## 2023-03-06 ENCOUNTER — APPOINTMENT (OUTPATIENT)
Dept: RADIATION THERAPY | Age: 73
End: 2023-03-06

## 2023-03-06 PROCEDURE — 77336 RADIATION PHYSICS CONSULT: CPT

## 2023-03-06 PROCEDURE — 77385 HC NTSTY MODUL RAD TX DLVR SMPL: CPT

## 2023-03-07 ENCOUNTER — APPOINTMENT (OUTPATIENT)
Facility: HOSPITAL | Age: 73
End: 2023-03-07
Attending: RADIOLOGY
Payer: MEDICARE

## 2023-03-07 ENCOUNTER — HOSPITAL ENCOUNTER (OUTPATIENT)
Facility: HOSPITAL | Age: 73
Setting detail: RECURRING SERIES
Discharge: HOME | End: 2023-03-10
Attending: RADIOLOGY
Payer: MEDICARE

## 2023-03-07 ENCOUNTER — APPOINTMENT (OUTPATIENT)
Dept: RADIATION THERAPY | Age: 73
End: 2023-03-07

## 2023-03-07 PROCEDURE — 77385 HC NTSTY MODUL RAD TX DLVR SMPL: CPT

## 2023-03-08 ENCOUNTER — HOSPITAL ENCOUNTER (OUTPATIENT)
Facility: HOSPITAL | Age: 73
Setting detail: RECURRING SERIES
Discharge: HOME | End: 2023-03-11
Attending: RADIOLOGY
Payer: MEDICARE

## 2023-03-08 ENCOUNTER — APPOINTMENT (OUTPATIENT)
Dept: RADIATION THERAPY | Age: 73
End: 2023-03-08

## 2023-03-08 ENCOUNTER — APPOINTMENT (OUTPATIENT)
Facility: HOSPITAL | Age: 73
End: 2023-03-08
Attending: RADIOLOGY
Payer: MEDICARE

## 2023-03-08 PROCEDURE — 77385 HC NTSTY MODUL RAD TX DLVR SMPL: CPT

## 2023-03-09 ENCOUNTER — APPOINTMENT (OUTPATIENT)
Facility: HOSPITAL | Age: 73
End: 2023-03-09
Attending: RADIOLOGY
Payer: MEDICARE

## 2023-03-09 ENCOUNTER — APPOINTMENT (OUTPATIENT)
Dept: RADIATION THERAPY | Age: 73
End: 2023-03-09

## 2023-03-09 ENCOUNTER — HOSPITAL ENCOUNTER (OUTPATIENT)
Facility: HOSPITAL | Age: 73
Setting detail: RECURRING SERIES
Discharge: HOME | End: 2023-03-12
Attending: RADIOLOGY
Payer: MEDICARE

## 2023-03-09 PROCEDURE — G6002 STEREOSCOPIC X-RAY GUIDANCE: HCPCS | Performed by: RADIOLOGY

## 2023-03-09 PROCEDURE — 77385 HC NTSTY MODUL RAD TX DLVR SMPL: CPT

## 2023-03-10 ENCOUNTER — HOSPITAL ENCOUNTER (OUTPATIENT)
Facility: HOSPITAL | Age: 73
Setting detail: RECURRING SERIES
Discharge: HOME | End: 2023-03-13
Attending: RADIOLOGY
Payer: MEDICARE

## 2023-03-10 ENCOUNTER — APPOINTMENT (OUTPATIENT)
Dept: RADIATION THERAPY | Age: 73
End: 2023-03-10

## 2023-03-10 ENCOUNTER — APPOINTMENT (OUTPATIENT)
Facility: HOSPITAL | Age: 73
End: 2023-03-10
Attending: RADIOLOGY
Payer: MEDICARE

## 2023-03-10 PROCEDURE — 77385 HC NTSTY MODUL RAD TX DLVR SMPL: CPT

## 2023-03-13 ENCOUNTER — APPOINTMENT (OUTPATIENT)
Dept: RADIATION THERAPY | Age: 73
End: 2023-03-13

## 2023-03-13 ENCOUNTER — HOSPITAL ENCOUNTER (OUTPATIENT)
Facility: HOSPITAL | Age: 73
Setting detail: RECURRING SERIES
Discharge: HOME | End: 2023-03-16
Attending: RADIOLOGY
Payer: MEDICARE

## 2023-03-13 ENCOUNTER — APPOINTMENT (OUTPATIENT)
Facility: HOSPITAL | Age: 73
End: 2023-03-13
Attending: RADIOLOGY
Payer: MEDICARE

## 2023-03-13 PROCEDURE — 77014 CHG CT GUIDANCE RADIATION THERAPY FLDS PLACEMENT: CPT | Performed by: RADIOLOGY

## 2023-03-13 PROCEDURE — 77336 RADIATION PHYSICS CONSULT: CPT

## 2023-03-13 PROCEDURE — 77385 HC NTSTY MODUL RAD TX DLVR SMPL: CPT

## 2023-03-14 ENCOUNTER — APPOINTMENT (OUTPATIENT)
Facility: HOSPITAL | Age: 73
End: 2023-03-14
Attending: RADIOLOGY
Payer: MEDICARE

## 2023-03-14 ENCOUNTER — HOSPITAL ENCOUNTER (OUTPATIENT)
Facility: HOSPITAL | Age: 73
Setting detail: RECURRING SERIES
Discharge: HOME | End: 2023-03-17
Attending: RADIOLOGY
Payer: MEDICARE

## 2023-03-14 ENCOUNTER — APPOINTMENT (OUTPATIENT)
Dept: RADIATION THERAPY | Age: 73
End: 2023-03-14

## 2023-03-14 PROCEDURE — 77427 RADIATION TX MANAGEMENT X5: CPT | Performed by: RADIOLOGY

## 2023-03-14 PROCEDURE — 77385 HC NTSTY MODUL RAD TX DLVR SMPL: CPT

## 2023-03-14 PROCEDURE — G6002 STEREOSCOPIC X-RAY GUIDANCE: HCPCS | Performed by: RADIOLOGY

## 2023-03-15 ENCOUNTER — APPOINTMENT (OUTPATIENT)
Dept: RADIATION THERAPY | Age: 73
End: 2023-03-15

## 2023-03-15 ENCOUNTER — HOSPITAL ENCOUNTER (OUTPATIENT)
Facility: HOSPITAL | Age: 73
Setting detail: RECURRING SERIES
Discharge: HOME | End: 2023-03-18
Attending: RADIOLOGY
Payer: MEDICARE

## 2023-03-15 ENCOUNTER — APPOINTMENT (OUTPATIENT)
Facility: HOSPITAL | Age: 73
End: 2023-03-15
Attending: RADIOLOGY
Payer: MEDICARE

## 2023-03-15 PROCEDURE — 77385 HC NTSTY MODUL RAD TX DLVR SMPL: CPT

## 2023-03-15 PROCEDURE — 77014 CHG CT GUIDANCE RADIATION THERAPY FLDS PLACEMENT: CPT | Performed by: RADIOLOGY

## 2023-03-16 ENCOUNTER — APPOINTMENT (OUTPATIENT)
Dept: RADIATION THERAPY | Age: 73
End: 2023-03-16

## 2023-03-16 ENCOUNTER — APPOINTMENT (OUTPATIENT)
Facility: HOSPITAL | Age: 73
End: 2023-03-16
Attending: RADIOLOGY
Payer: MEDICARE

## 2023-03-16 ENCOUNTER — HOSPITAL ENCOUNTER (OUTPATIENT)
Facility: HOSPITAL | Age: 73
Setting detail: RECURRING SERIES
Discharge: HOME | End: 2023-03-19
Attending: RADIOLOGY
Payer: MEDICARE

## 2023-03-16 PROCEDURE — G6002 STEREOSCOPIC X-RAY GUIDANCE: HCPCS | Performed by: RADIOLOGY

## 2023-03-16 PROCEDURE — 77385 HC NTSTY MODUL RAD TX DLVR SMPL: CPT

## 2023-03-17 ENCOUNTER — HOSPITAL ENCOUNTER (OUTPATIENT)
Facility: HOSPITAL | Age: 73
Setting detail: RECURRING SERIES
Discharge: HOME | End: 2023-03-20
Attending: RADIOLOGY
Payer: MEDICARE

## 2023-03-17 ENCOUNTER — APPOINTMENT (OUTPATIENT)
Facility: HOSPITAL | Age: 73
End: 2023-03-17
Attending: RADIOLOGY
Payer: MEDICARE

## 2023-03-17 ENCOUNTER — APPOINTMENT (OUTPATIENT)
Dept: RADIATION THERAPY | Age: 73
End: 2023-03-17

## 2023-03-17 PROCEDURE — 77385 HC NTSTY MODUL RAD TX DLVR SMPL: CPT

## 2023-03-17 PROCEDURE — G6002 STEREOSCOPIC X-RAY GUIDANCE: HCPCS | Performed by: RADIOLOGY

## 2023-03-20 ENCOUNTER — APPOINTMENT (OUTPATIENT)
Facility: HOSPITAL | Age: 73
End: 2023-03-20
Attending: RADIOLOGY
Payer: MEDICARE

## 2023-03-20 ENCOUNTER — APPOINTMENT (OUTPATIENT)
Dept: RADIATION THERAPY | Age: 73
End: 2023-03-20

## 2023-03-20 ENCOUNTER — HOSPITAL ENCOUNTER (OUTPATIENT)
Facility: HOSPITAL | Age: 73
Setting detail: RECURRING SERIES
Discharge: HOME | End: 2023-03-23
Attending: RADIOLOGY
Payer: MEDICARE

## 2023-03-20 PROCEDURE — 77385 HC NTSTY MODUL RAD TX DLVR SMPL: CPT

## 2023-03-20 PROCEDURE — 77336 RADIATION PHYSICS CONSULT: CPT

## 2023-03-20 PROCEDURE — 77014 CHG CT GUIDANCE RADIATION THERAPY FLDS PLACEMENT: CPT | Performed by: RADIOLOGY

## 2023-03-21 ENCOUNTER — HOSPITAL ENCOUNTER (OUTPATIENT)
Facility: HOSPITAL | Age: 73
Discharge: HOME | End: 2023-03-24
Payer: MEDICARE

## 2023-03-21 ENCOUNTER — APPOINTMENT (OUTPATIENT)
Facility: HOSPITAL | Age: 73
End: 2023-03-21
Attending: RADIOLOGY
Payer: MEDICARE

## 2023-03-21 ENCOUNTER — APPOINTMENT (OUTPATIENT)
Dept: RADIATION THERAPY | Age: 73
End: 2023-03-21

## 2023-03-21 ENCOUNTER — HOSPITAL ENCOUNTER (OUTPATIENT)
Facility: HOSPITAL | Age: 73
Setting detail: RECURRING SERIES
Discharge: HOME | End: 2023-03-24
Attending: RADIOLOGY
Payer: MEDICARE

## 2023-03-21 DIAGNOSIS — C61 MALIGNANT NEOPLASM OF PROSTATE (HCC): Primary | ICD-10-CM

## 2023-03-21 LAB
APPEARANCE UR: CLEAR
BILIRUB UR QL: NEGATIVE
COLOR UR: YELLOW
GLUCOSE UR STRIP.AUTO-MCNC: NEGATIVE MG/DL
HGB UR QL STRIP: NEGATIVE
KETONES UR QL STRIP.AUTO: NEGATIVE MG/DL
LEUKOCYTE ESTERASE UR QL STRIP.AUTO: NEGATIVE
NITRITE UR QL STRIP.AUTO: NEGATIVE
PH UR STRIP: 7 (ref 5–8)
PROT UR STRIP-MCNC: NEGATIVE MG/DL
SP GR UR REFRACTOMETRY: <1.005 (ref 1–1.03)
UROBILINOGEN UR QL STRIP.AUTO: 0.2 EU/DL (ref 0.2–1)

## 2023-03-21 PROCEDURE — 36415 COLL VENOUS BLD VENIPUNCTURE: CPT

## 2023-03-21 PROCEDURE — 77385 HC NTSTY MODUL RAD TX DLVR SMPL: CPT

## 2023-03-21 PROCEDURE — G6002 STEREOSCOPIC X-RAY GUIDANCE: HCPCS | Performed by: RADIOLOGY

## 2023-03-21 PROCEDURE — 77427 RADIATION TX MANAGEMENT X5: CPT | Performed by: RADIOLOGY

## 2023-03-21 PROCEDURE — 81003 URINALYSIS AUTO W/O SCOPE: CPT

## 2023-03-21 PROCEDURE — 87086 URINE CULTURE/COLONY COUNT: CPT

## 2023-03-22 ENCOUNTER — HOSPITAL ENCOUNTER (OUTPATIENT)
Facility: HOSPITAL | Age: 73
Setting detail: RECURRING SERIES
Discharge: HOME | End: 2023-03-25
Attending: RADIOLOGY
Payer: MEDICARE

## 2023-03-22 ENCOUNTER — APPOINTMENT (OUTPATIENT)
Facility: HOSPITAL | Age: 73
End: 2023-03-22
Attending: RADIOLOGY
Payer: MEDICARE

## 2023-03-22 ENCOUNTER — APPOINTMENT (OUTPATIENT)
Dept: RADIATION THERAPY | Age: 73
End: 2023-03-22

## 2023-03-22 LAB
BACTERIA SPEC CULT: NORMAL
SERVICE CMNT-IMP: NORMAL

## 2023-03-22 PROCEDURE — 77014 CHG CT GUIDANCE RADIATION THERAPY FLDS PLACEMENT: CPT | Performed by: RADIOLOGY

## 2023-03-22 PROCEDURE — 77385 HC NTSTY MODUL RAD TX DLVR SMPL: CPT

## 2023-03-23 ENCOUNTER — APPOINTMENT (OUTPATIENT)
Dept: RADIATION THERAPY | Age: 73
End: 2023-03-23

## 2023-03-23 ENCOUNTER — APPOINTMENT (OUTPATIENT)
Facility: HOSPITAL | Age: 73
End: 2023-03-23
Attending: RADIOLOGY
Payer: MEDICARE

## 2023-03-23 ENCOUNTER — HOSPITAL ENCOUNTER (OUTPATIENT)
Facility: HOSPITAL | Age: 73
Setting detail: RECURRING SERIES
Discharge: HOME | End: 2023-03-26
Attending: RADIOLOGY
Payer: MEDICARE

## 2023-03-23 PROCEDURE — G6002 STEREOSCOPIC X-RAY GUIDANCE: HCPCS | Performed by: RADIOLOGY

## 2023-03-23 PROCEDURE — 77385 HC NTSTY MODUL RAD TX DLVR SMPL: CPT

## 2023-03-24 ENCOUNTER — APPOINTMENT (OUTPATIENT)
Dept: RADIATION THERAPY | Age: 73
End: 2023-03-24

## 2023-03-24 ENCOUNTER — APPOINTMENT (OUTPATIENT)
Facility: HOSPITAL | Age: 73
End: 2023-03-24
Attending: RADIOLOGY
Payer: MEDICARE

## 2023-03-24 ENCOUNTER — HOSPITAL ENCOUNTER (OUTPATIENT)
Facility: HOSPITAL | Age: 73
Setting detail: RECURRING SERIES
Discharge: HOME | End: 2023-03-27
Attending: RADIOLOGY
Payer: MEDICARE

## 2023-03-24 PROCEDURE — G6002 STEREOSCOPIC X-RAY GUIDANCE: HCPCS | Performed by: RADIOLOGY

## 2023-03-24 PROCEDURE — 77385 HC NTSTY MODUL RAD TX DLVR SMPL: CPT

## 2023-03-24 PROCEDURE — 77336 RADIATION PHYSICS CONSULT: CPT

## 2023-03-27 ENCOUNTER — APPOINTMENT (OUTPATIENT)
Dept: RADIATION THERAPY | Age: 73
End: 2023-03-27

## 2023-03-27 ENCOUNTER — APPOINTMENT (OUTPATIENT)
Facility: HOSPITAL | Age: 73
End: 2023-03-27
Attending: RADIOLOGY
Payer: MEDICARE

## 2023-03-28 ENCOUNTER — APPOINTMENT (OUTPATIENT)
Dept: RADIATION THERAPY | Age: 73
End: 2023-03-28

## 2023-03-28 ENCOUNTER — APPOINTMENT (OUTPATIENT)
Facility: HOSPITAL | Age: 73
End: 2023-03-28
Attending: RADIOLOGY
Payer: MEDICARE

## 2023-03-29 ENCOUNTER — APPOINTMENT (OUTPATIENT)
Facility: HOSPITAL | Age: 73
End: 2023-03-29
Attending: RADIOLOGY
Payer: MEDICARE

## 2023-03-29 ENCOUNTER — APPOINTMENT (OUTPATIENT)
Dept: RADIATION THERAPY | Age: 73
End: 2023-03-29

## 2023-03-30 ENCOUNTER — APPOINTMENT (OUTPATIENT)
Facility: HOSPITAL | Age: 73
End: 2023-03-30
Attending: RADIOLOGY
Payer: MEDICARE

## 2023-03-30 ENCOUNTER — APPOINTMENT (OUTPATIENT)
Dept: RADIATION THERAPY | Age: 73
End: 2023-03-30

## 2023-03-31 ENCOUNTER — APPOINTMENT (OUTPATIENT)
Dept: RADIATION THERAPY | Age: 73
End: 2023-03-31

## 2023-03-31 ENCOUNTER — APPOINTMENT (OUTPATIENT)
Facility: HOSPITAL | Age: 73
End: 2023-03-31
Attending: RADIOLOGY
Payer: MEDICARE

## 2023-04-03 ENCOUNTER — APPOINTMENT (OUTPATIENT)
Facility: HOSPITAL | Age: 73
End: 2023-04-03
Attending: RADIOLOGY
Payer: MEDICARE

## 2023-04-04 ENCOUNTER — APPOINTMENT (OUTPATIENT)
Facility: HOSPITAL | Age: 73
End: 2023-04-04
Attending: RADIOLOGY
Payer: MEDICARE

## 2023-05-09 ENCOUNTER — HOSPITAL ENCOUNTER (OUTPATIENT)
Facility: HOSPITAL | Age: 73
Setting detail: RECURRING SERIES
Discharge: HOME | End: 2023-05-12
Attending: RADIOLOGY
Payer: MEDICARE

## 2023-05-09 PROCEDURE — 99024 POSTOP FOLLOW-UP VISIT: CPT | Performed by: RADIOLOGY

## 2023-05-09 PROCEDURE — 99214 OFFICE O/P EST MOD 30 MIN: CPT

## 2024-04-26 DIAGNOSIS — C61 MALIGNANT NEOPLASM OF PROSTATE (HCC): Primary | ICD-10-CM

## 2024-04-29 ENCOUNTER — HOSPITAL ENCOUNTER (OUTPATIENT)
Facility: HOSPITAL | Age: 74
Discharge: HOME | End: 2024-05-02
Payer: MEDICARE

## 2024-04-29 DIAGNOSIS — C61 MALIGNANT NEOPLASM OF PROSTATE (HCC): ICD-10-CM

## 2024-04-29 LAB — PSA SERPL-MCNC: 2.6 NG/ML (ref 0–4)

## 2024-04-29 PROCEDURE — 84403 ASSAY OF TOTAL TESTOSTERONE: CPT

## 2024-04-29 PROCEDURE — 84153 ASSAY OF PSA TOTAL: CPT

## 2024-04-29 PROCEDURE — 36415 COLL VENOUS BLD VENIPUNCTURE: CPT

## 2024-05-01 LAB — TESTOST SERPL-MCNC: 292 NG/DL (ref 264–916)

## 2024-05-09 ENCOUNTER — HOSPITAL ENCOUNTER (OUTPATIENT)
Facility: HOSPITAL | Age: 74
Setting detail: RECURRING SERIES
Discharge: HOME | End: 2024-05-12
Attending: RADIOLOGY
Payer: MEDICARE

## 2024-05-09 DIAGNOSIS — C61 MALIGNANT NEOPLASM OF PROSTATE (HCC): Primary | ICD-10-CM

## 2024-05-09 PROCEDURE — 99213 OFFICE O/P EST LOW 20 MIN: CPT

## 2025-04-07 ENCOUNTER — HOSPITAL ENCOUNTER (OUTPATIENT)
Facility: HOSPITAL | Age: 75
Discharge: HOME | End: 2025-04-10
Payer: MEDICARE

## 2025-04-07 DIAGNOSIS — R91.1 PULMONARY NODULE: ICD-10-CM

## 2025-04-07 PROCEDURE — 71250 CT THORAX DX C-: CPT

## 2025-05-05 ENCOUNTER — HOSPITAL ENCOUNTER (OUTPATIENT)
Facility: HOSPITAL | Age: 75
Discharge: HOME | End: 2025-05-08
Payer: MEDICARE

## 2025-05-05 DIAGNOSIS — C61 MALIGNANT NEOPLASM OF PROSTATE (HCC): ICD-10-CM

## 2025-05-05 LAB — PSA SERPL-MCNC: 1.2 NG/ML (ref 1.4–4.4)

## 2025-05-05 PROCEDURE — 36415 COLL VENOUS BLD VENIPUNCTURE: CPT

## 2025-05-05 PROCEDURE — 84403 ASSAY OF TOTAL TESTOSTERONE: CPT

## 2025-05-05 PROCEDURE — 84153 ASSAY OF PSA TOTAL: CPT

## 2025-05-06 LAB — TESTOST SERPL-MCNC: 261 NG/DL (ref 264–916)

## 2025-05-09 ENCOUNTER — HOSPITAL ENCOUNTER (OUTPATIENT)
Facility: HOSPITAL | Age: 75
Setting detail: RECURRING SERIES
Discharge: HOME | End: 2025-05-12
Attending: RADIOLOGY
Payer: MEDICARE

## 2025-05-09 PROCEDURE — 99214 OFFICE O/P EST MOD 30 MIN: CPT

## 2025-05-13 DIAGNOSIS — C61 PROSTATE CANCER (HCC): Primary | ICD-10-CM

## (undated) DEVICE — GAUZE,SPONGE,4"X4",12PLY,STERILE,LF,2'S: Brand: MEDLINE

## (undated) DEVICE — 1016 S-DRAPE IRRIG POUCH 10/BOX: Brand: STERI-DRAPE™

## (undated) DEVICE — BOWL 32 OZ CAP PLAS

## (undated) DEVICE — STERILE POLYISOPRENE POWDER-FREE SURGICAL GLOVES WITH EMOLLIENT COATING: Brand: PROTEXIS

## (undated) DEVICE — TOWEL SURG W16XL26IN BLU NONFENESTRATED DLX ST 2 PER PK

## (undated) DEVICE — SYRINGE MED 10ML TRNSLUC BRL PLUNG BLK MRK POLYPR CTRL

## (undated) DEVICE — TABLE COVER: Brand: CONVERTORS

## (undated) DEVICE — NON-STERILE (2 X 14CM) ENDOCAVITY BALLOON FOR USE WITH ACCUCARE POSITIONING AND STABILIZING EQUIPMENT: Brand: ENDOCAVITY BALLOON

## (undated) DEVICE — SOLUTION IRRIG 1000ML H2O STRL BLT

## (undated) DEVICE — TAPE DRSG 3INX10YD SILK HYPOALRG CURASILK

## (undated) DEVICE — KIT NDL 17GA L20CM MRK L3MM DIA1.2MM SFT TISS G PLCMNT

## (undated) DEVICE — TEMPLATE BRACHYTHERAPY 17GA GRID DISP FOR ACCUCARE POS AND

## (undated) DEVICE — 1010 S-DRAPE TOWEL DRAPE 10/BX: Brand: STERI-DRAPE™

## (undated) DEVICE — SPACEOAR SYSTEMS
Type: IMPLANTABLE DEVICE | Site: PERINEUM | Status: NON-FUNCTIONAL
Brand: SPACEOAR VUE™ SYSTEM - 10ML